# Patient Record
Sex: MALE | Race: WHITE | Employment: FULL TIME | URBAN - METROPOLITAN AREA
[De-identification: names, ages, dates, MRNs, and addresses within clinical notes are randomized per-mention and may not be internally consistent; named-entity substitution may affect disease eponyms.]

---

## 2017-02-27 ENCOUNTER — OFFICE VISIT (OUTPATIENT)
Dept: FAMILY MEDICINE CLINIC | Facility: CLINIC | Age: 52
End: 2017-02-27

## 2017-02-27 VITALS
BODY MASS INDEX: 39.4 KG/M2 | TEMPERATURE: 98 F | WEIGHT: 266 LBS | HEIGHT: 69 IN | DIASTOLIC BLOOD PRESSURE: 92 MMHG | SYSTOLIC BLOOD PRESSURE: 134 MMHG | RESPIRATION RATE: 16 BRPM | HEART RATE: 80 BPM

## 2017-02-27 DIAGNOSIS — R40.0 DAYTIME SOMNOLENCE: ICD-10-CM

## 2017-02-27 DIAGNOSIS — R06.83 SNORING: ICD-10-CM

## 2017-02-27 DIAGNOSIS — I10 ESSENTIAL HYPERTENSION: ICD-10-CM

## 2017-02-27 DIAGNOSIS — Z00.00 ANNUAL PHYSICAL EXAM: Primary | ICD-10-CM

## 2017-02-27 DIAGNOSIS — Z13.89 SCREENING FOR GENITOURINARY CONDITION: ICD-10-CM

## 2017-02-27 DIAGNOSIS — E78.00 HYPERCHOLESTEREMIA: ICD-10-CM

## 2017-02-27 LAB
APPEARANCE: CLEAR
PH, URINE: 5.5 (ref 4.5–8)
SPECIFIC GRAVITY: 1.03 (ref 1–1.03)
URINE-COLOR: YELLOW
UROBILINOGEN,SEMI-QN: 0.2 MG/DL (ref 0–1.9)

## 2017-02-27 PROCEDURE — 99396 PREV VISIT EST AGE 40-64: CPT | Performed by: FAMILY MEDICINE

## 2017-02-27 PROCEDURE — 81003 URINALYSIS AUTO W/O SCOPE: CPT | Performed by: FAMILY MEDICINE

## 2017-02-27 NOTE — PROGRESS NOTES
Yolie Lugo is a 46year old male who presents for a complete physical exam.   HPI:   Pt states his HTN has been under good control. He denies chest pain or unusual SOB. Brennan Zamarripa He takes simvastatin for his hypercholesterolemia without problem.   Has a hx of lo 4,000 Int'l Units by mouth daily. Disp:  Rfl:    Coenzyme Q10 (COQ-10 OR) Take 1 capsule by mouth daily. Disp:  Rfl:    Lactobacillus (PROBIATA OR) Take 1 tablet by mouth daily. Disp:  Rfl:    aspirin 81 MG Oral Tab Take 81 mg by mouth daily.  Disp:  Rfl: exertion  CARDIOVASCULAR: denies chest pain on exertion  GI: denies abdominal pain,denies heartburn  : denies nocturia or changes in stream  MUSCULOSKELETAL: denies back pain  NEURO: denies headaches  PSYCHE: denies depression or anxiety  HEMATOLOGIC: de

## 2017-02-28 ENCOUNTER — LAB ENCOUNTER (OUTPATIENT)
Dept: LAB | Age: 52
End: 2017-02-28
Attending: FAMILY MEDICINE
Payer: COMMERCIAL

## 2017-02-28 DIAGNOSIS — Z13.0 SCREENING FOR DEFICIENCY ANEMIA: ICD-10-CM

## 2017-02-28 DIAGNOSIS — E78.00 HYPERCHOLESTEREMIA: ICD-10-CM

## 2017-02-28 DIAGNOSIS — Z12.5 SCREENING FOR PROSTATE CANCER: ICD-10-CM

## 2017-02-28 LAB
ALBUMIN SERPL-MCNC: 4 G/DL (ref 3.5–4.8)
ALP LIVER SERPL-CCNC: 73 U/L (ref 45–117)
ALT SERPL-CCNC: 29 U/L (ref 17–63)
AST SERPL-CCNC: 20 U/L (ref 15–41)
BASOPHILS # BLD AUTO: 0.09 X10(3) UL (ref 0–0.1)
BASOPHILS NFR BLD AUTO: 1.8 %
BILIRUB SERPL-MCNC: 0.5 MG/DL (ref 0.1–2)
BUN BLD-MCNC: 17 MG/DL (ref 8–20)
CALCIUM BLD-MCNC: 8.9 MG/DL (ref 8.3–10.3)
CHLORIDE: 105 MMOL/L (ref 101–111)
CHOLEST SMN-MCNC: 174 MG/DL (ref ?–200)
CO2: 28 MMOL/L (ref 22–32)
CREAT BLD-MCNC: 0.91 MG/DL (ref 0.7–1.3)
EOSINOPHIL # BLD AUTO: 0.28 X10(3) UL (ref 0–0.3)
EOSINOPHIL NFR BLD AUTO: 5.7 %
ERYTHROCYTE [DISTWIDTH] IN BLOOD BY AUTOMATED COUNT: 13.1 % (ref 11.5–16)
GLUCOSE BLD-MCNC: 102 MG/DL (ref 70–99)
HCT VFR BLD AUTO: 41.5 % (ref 37–53)
HDLC SERPL-MCNC: 46 MG/DL (ref 45–?)
HDLC SERPL: 3.78 {RATIO} (ref ?–4.97)
HGB BLD-MCNC: 13.6 G/DL (ref 13–17)
IMMATURE GRANULOCYTE COUNT: 0.02 X10(3) UL (ref 0–1)
IMMATURE GRANULOCYTE RATIO %: 0.4 %
LDLC SERPL CALC-MCNC: 76 MG/DL (ref ?–130)
LYMPHOCYTES # BLD AUTO: 2.11 X10(3) UL (ref 0.9–4)
LYMPHOCYTES NFR BLD AUTO: 43.1 %
M PROTEIN MFR SERPL ELPH: 7.4 G/DL (ref 6.1–8.3)
MCH RBC QN AUTO: 29.6 PG (ref 27–33.2)
MCHC RBC AUTO-ENTMCNC: 32.8 G/DL (ref 31–37)
MCV RBC AUTO: 90.2 FL (ref 80–99)
MONOCYTES # BLD AUTO: 0.47 X10(3) UL (ref 0.1–0.6)
MONOCYTES NFR BLD AUTO: 9.6 %
NEUTROPHIL ABS PRELIM: 1.92 X10 (3) UL (ref 1.3–6.7)
NEUTROPHILS # BLD AUTO: 1.92 X10(3) UL (ref 1.3–6.7)
NEUTROPHILS NFR BLD AUTO: 39.4 %
NONHDLC SERPL-MCNC: 128 MG/DL (ref ?–130)
PLATELET # BLD AUTO: 263 10(3)UL (ref 150–450)
POTASSIUM SERPL-SCNC: 4.5 MMOL/L (ref 3.6–5.1)
PSA SERPL-MCNC: 0.73 NG/ML (ref 0.01–4)
RBC # BLD AUTO: 4.6 X10(6)UL (ref 4.3–5.7)
RED CELL DISTRIBUTION WIDTH-SD: 42.8 FL (ref 35.1–46.3)
SODIUM SERPL-SCNC: 139 MMOL/L (ref 136–144)
TRIGLYCERIDES: 262 MG/DL (ref ?–150)
VLDL: 52 MG/DL (ref 5–40)
WBC # BLD AUTO: 4.9 X10(3) UL (ref 4–13)

## 2017-02-28 PROCEDURE — 36415 COLL VENOUS BLD VENIPUNCTURE: CPT

## 2017-02-28 PROCEDURE — 85025 COMPLETE CBC W/AUTO DIFF WBC: CPT

## 2017-02-28 PROCEDURE — 84153 ASSAY OF PSA TOTAL: CPT

## 2017-02-28 PROCEDURE — 80061 LIPID PANEL: CPT

## 2017-02-28 PROCEDURE — 80053 COMPREHEN METABOLIC PANEL: CPT

## 2017-03-01 ENCOUNTER — TELEPHONE (OUTPATIENT)
Dept: FAMILY MEDICINE CLINIC | Facility: CLINIC | Age: 52
End: 2017-03-01

## 2017-03-01 DIAGNOSIS — E78.00 HYPERCHOLESTEREMIA: Primary | ICD-10-CM

## 2017-03-14 ENCOUNTER — OFFICE VISIT (OUTPATIENT)
Dept: SLEEP CENTER | Facility: HOSPITAL | Age: 52
End: 2017-03-14
Attending: FAMILY MEDICINE
Payer: COMMERCIAL

## 2017-03-14 PROCEDURE — 95806 SLEEP STUDY UNATT&RESP EFFT: CPT

## 2017-03-16 ENCOUNTER — HOSPITAL ENCOUNTER (OUTPATIENT)
Dept: CV DIAGNOSTICS | Facility: HOSPITAL | Age: 52
Discharge: HOME | End: 2017-03-16
Attending: FAMILY MEDICINE
Payer: COMMERCIAL

## 2017-03-16 DIAGNOSIS — Z82.49 FAMILY HISTORY OF HEART DISEASE: ICD-10-CM

## 2017-03-16 DIAGNOSIS — R93.1 ABNORMAL SCREENING CARDIAC CT: ICD-10-CM

## 2017-03-16 DIAGNOSIS — I10 ESSENTIAL HYPERTENSION: ICD-10-CM

## 2017-03-16 DIAGNOSIS — E66.9 OBESITY WITH BODY MASS INDEX OF 30.0-39.9: ICD-10-CM

## 2017-03-16 DIAGNOSIS — E78.00 HYPERCHOLESTEREMIA: ICD-10-CM

## 2017-03-16 PROCEDURE — 93350 STRESS TTE ONLY: CPT

## 2017-03-16 PROCEDURE — 93350 STRESS TTE ONLY: CPT | Performed by: INTERNAL MEDICINE

## 2017-03-16 PROCEDURE — 93017 CV STRESS TEST TRACING ONLY: CPT

## 2017-03-16 PROCEDURE — 93018 CV STRESS TEST I&R ONLY: CPT | Performed by: INTERNAL MEDICINE

## 2017-03-27 RX ORDER — VALSARTAN 160 MG/1
TABLET ORAL
Qty: 90 TABLET | Refills: 0 | Status: SHIPPED | OUTPATIENT
Start: 2017-03-27 | End: 2017-06-25

## 2017-03-27 RX ORDER — SIMVASTATIN 10 MG
TABLET ORAL
Qty: 90 TABLET | Refills: 0 | Status: SHIPPED | OUTPATIENT
Start: 2017-03-27 | End: 2017-06-25

## 2017-03-31 NOTE — PROCEDURES
[address]       Accredited by the [facility] of Sleep Medicine ([facility])    PATIENT'S NAME:        Almasleandra Izaadarsh  ATTENDING PHYSICIAN:   Les Graham M.D.   REFERRING PHYSICIAN:   MAYELIN Hernandez position. RECOMMENDATIONS:    1. Given severity of sleep-disordered breathing, it is recommend the patient pursue definitive therapy by undergoing CPAP desensitization followed by CPAP titration.   Alternatively, if he is unable to undergo CPAP titrati

## 2017-03-31 NOTE — PROGRESS NOTES
Quick Note:    Pt will be notified by CPAP coordinator and f/u with sleep clinic will be made.   ______

## 2017-04-01 NOTE — PROCEDURES
[address]       Accredited by the [facility] of Sleep Medicine (AASM)    PATIENT'S NAME:        Dorothy Maldonado  ATTENDING PHYSICIAN:   Mya Tong M.D.   REFERRING PHYSICIAN:   Mayda Echeverria M.D.  PA SaO2 dwight of 83%. The patient spent about 7%, which was 33 minutes, of recording time with oxygen saturation levels of 88% or below. Snoring was noted during the study. Heart rate averaged 67.     IMPRESSION:  This home sleep test documents moderate obs

## 2017-06-26 RX ORDER — VALSARTAN 160 MG/1
TABLET ORAL
Qty: 90 TABLET | Refills: 0 | Status: SHIPPED | OUTPATIENT
Start: 2017-06-26 | End: 2017-09-24

## 2017-06-26 RX ORDER — SIMVASTATIN 10 MG
TABLET ORAL
Qty: 90 TABLET | Refills: 1 | Status: SHIPPED | OUTPATIENT
Start: 2017-06-26 | End: 2017-12-23

## 2017-07-26 PROBLEM — G47.33 OSA (OBSTRUCTIVE SLEEP APNEA): Status: ACTIVE | Noted: 2017-07-26

## 2017-09-25 RX ORDER — VALSARTAN 160 MG/1
TABLET ORAL
Qty: 90 TABLET | Refills: 0 | Status: SHIPPED | OUTPATIENT
Start: 2017-09-25 | End: 2017-12-24

## 2017-12-14 ENCOUNTER — OFFICE VISIT (OUTPATIENT)
Dept: FAMILY MEDICINE CLINIC | Facility: CLINIC | Age: 52
End: 2017-12-14

## 2017-12-14 VITALS
HEIGHT: 70 IN | RESPIRATION RATE: 16 BRPM | TEMPERATURE: 97 F | SYSTOLIC BLOOD PRESSURE: 150 MMHG | WEIGHT: 254 LBS | BODY MASS INDEX: 36.36 KG/M2 | HEART RATE: 78 BPM | DIASTOLIC BLOOD PRESSURE: 90 MMHG

## 2017-12-14 DIAGNOSIS — I10 ESSENTIAL HYPERTENSION: Primary | ICD-10-CM

## 2017-12-14 DIAGNOSIS — E66.9 OBESITY WITH BODY MASS INDEX OF 30.0-39.9: ICD-10-CM

## 2017-12-14 DIAGNOSIS — E78.00 HYPERCHOLESTEREMIA: ICD-10-CM

## 2017-12-14 PROCEDURE — 99214 OFFICE O/P EST MOD 30 MIN: CPT | Performed by: FAMILY MEDICINE

## 2017-12-14 NOTE — PROGRESS NOTES
HPI:   Art Blind is a 45year old male who presents for recheck of hyperlipidemia. Patient reports taking medications as instructed, no medication side effects noted. Denies any generalized muscle aches.  Patient presents for recheck of his hypertension ----------       Current Outpatient Prescriptions:  VALSARTAN 160 MG Oral Tab TAKE 1 TABLET DAILY Disp: 90 tablet Rfl: 0   SIMVASTATIN 10 MG Oral Tab TAKE 1 TABLET NIGHTLY Disp: 90 tablet Rfl: 1   Omeprazole 40 MG Oral Capsule Delayed Release Take 40 mg lesions or rashes  RESPIRATORY: denies shortness of breath with exertion  CARDIOVASCULAR: denies chest pain on exertion  GI: denies abdominal pain and denies heartburn  NEURO: denies headaches    EXAM:   /90   Pulse 78   Temp (!) 97.3 °F (36.3 °C) (O

## 2017-12-26 RX ORDER — SIMVASTATIN 10 MG
TABLET ORAL
Qty: 90 TABLET | Refills: 0 | Status: SHIPPED | OUTPATIENT
Start: 2017-12-26 | End: 2018-03-26

## 2017-12-26 RX ORDER — VALSARTAN 160 MG/1
TABLET ORAL
Qty: 90 TABLET | Refills: 1 | Status: SHIPPED | OUTPATIENT
Start: 2017-12-26 | End: 2018-03-12

## 2018-02-13 ENCOUNTER — APPOINTMENT (OUTPATIENT)
Dept: LAB | Age: 53
End: 2018-02-13
Attending: FAMILY MEDICINE
Payer: COMMERCIAL

## 2018-02-13 DIAGNOSIS — E78.00 HYPERCHOLESTEREMIA: ICD-10-CM

## 2018-02-13 LAB
ALBUMIN SERPL-MCNC: 4 G/DL (ref 3.5–4.8)
ALP LIVER SERPL-CCNC: 70 U/L (ref 45–117)
ALT SERPL-CCNC: 27 U/L (ref 17–63)
AST SERPL-CCNC: 16 U/L (ref 15–41)
BILIRUB SERPL-MCNC: 0.6 MG/DL (ref 0.1–2)
BUN BLD-MCNC: 15 MG/DL (ref 8–20)
CALCIUM BLD-MCNC: 9.2 MG/DL (ref 8.3–10.3)
CHLORIDE: 106 MMOL/L (ref 101–111)
CHOLEST SMN-MCNC: 170 MG/DL (ref ?–200)
CO2: 28 MMOL/L (ref 22–32)
CREAT BLD-MCNC: 0.9 MG/DL (ref 0.7–1.3)
GLUCOSE BLD-MCNC: 102 MG/DL (ref 70–99)
HDLC SERPL-MCNC: 44 MG/DL (ref 45–?)
HDLC SERPL: 3.86 {RATIO} (ref ?–4.97)
LDLC SERPL CALC-MCNC: 88 MG/DL (ref ?–130)
M PROTEIN MFR SERPL ELPH: 7.1 G/DL (ref 6.1–8.3)
NONHDLC SERPL-MCNC: 126 MG/DL (ref ?–130)
POTASSIUM SERPL-SCNC: 4.5 MMOL/L (ref 3.6–5.1)
SODIUM SERPL-SCNC: 140 MMOL/L (ref 136–144)
TRIGL SERPL-MCNC: 191 MG/DL (ref ?–150)
VLDLC SERPL CALC-MCNC: 38 MG/DL (ref 5–40)

## 2018-02-13 PROCEDURE — 36415 COLL VENOUS BLD VENIPUNCTURE: CPT

## 2018-02-13 PROCEDURE — 80061 LIPID PANEL: CPT

## 2018-02-13 PROCEDURE — 80053 COMPREHEN METABOLIC PANEL: CPT

## 2018-03-12 ENCOUNTER — OFFICE VISIT (OUTPATIENT)
Dept: FAMILY MEDICINE CLINIC | Facility: CLINIC | Age: 53
End: 2018-03-12

## 2018-03-12 VITALS
SYSTOLIC BLOOD PRESSURE: 160 MMHG | HEART RATE: 84 BPM | WEIGHT: 267 LBS | DIASTOLIC BLOOD PRESSURE: 92 MMHG | BODY MASS INDEX: 38.22 KG/M2 | RESPIRATION RATE: 16 BRPM | HEIGHT: 70 IN | TEMPERATURE: 98 F

## 2018-03-12 DIAGNOSIS — I10 ESSENTIAL HYPERTENSION: ICD-10-CM

## 2018-03-12 DIAGNOSIS — Z00.00 ANNUAL PHYSICAL EXAM: Primary | ICD-10-CM

## 2018-03-12 DIAGNOSIS — E78.00 HYPERCHOLESTEREMIA: ICD-10-CM

## 2018-03-12 DIAGNOSIS — Z12.5 SCREENING FOR PROSTATE CANCER: ICD-10-CM

## 2018-03-12 PROCEDURE — 99214 OFFICE O/P EST MOD 30 MIN: CPT | Performed by: FAMILY MEDICINE

## 2018-03-12 RX ORDER — VALSARTAN 320 MG/1
320 TABLET ORAL DAILY
Qty: 90 TABLET | Refills: 1 | Status: SHIPPED | OUTPATIENT
Start: 2018-03-12 | End: 2019-03-08 | Stop reason: ALTCHOICE

## 2018-03-12 NOTE — PROGRESS NOTES
Pt advised to increase his valsartan to 320mg daily. He will double his current dose until almost out, then will contact us to refill.

## 2018-03-12 NOTE — PROGRESS NOTES
Geovanny Gomez is a 45year old male who presents for a complete physical exam.   HPI:   Pt states his HTN has been under good control. He denies chest pain or unusual SOB. Brittany Spar He takes simvastatin for his hypercholesterolemia without problem.   He has been us Oral Tab TAKE 1 TABLET DAILY Disp: 90 tablet Rfl: 1   Omeprazole 40 MG Oral Capsule Delayed Release Take 40 mg by mouth daily. Disp:  Rfl:    Ergocalciferol (VITAMIN D OR) Take 4,000 Int'l Units by mouth daily.  Disp:  Rfl:    Coenzyme Q10 (COQ-10 OR) Take Y. Children: 3.    Exercise: minimal.  Diet: watches fats closely    REVIEW OF SYSTEMS:   GENERAL: feels well otherwise  SKIN: denies any unusual skin lesions  EYES:denies blurred vision or double vision  HEENT: denies nasal congestion, sinus pain or ST  ALYSSA prostate cancer  - PSA;  Future    Nurse blood pressure check in 2 weeks    Work on diet, exercise and weight loss  See again in 3 months

## 2018-03-26 RX ORDER — SIMVASTATIN 10 MG
TABLET ORAL
Qty: 90 TABLET | Refills: 0 | Status: SHIPPED | OUTPATIENT
Start: 2018-03-26 | End: 2018-06-24

## 2018-06-05 ENCOUNTER — OFFICE VISIT (OUTPATIENT)
Dept: FAMILY MEDICINE CLINIC | Facility: CLINIC | Age: 53
End: 2018-06-05

## 2018-06-05 VITALS
BODY MASS INDEX: 35 KG/M2 | RESPIRATION RATE: 16 BRPM | HEART RATE: 56 BPM | DIASTOLIC BLOOD PRESSURE: 94 MMHG | WEIGHT: 246 LBS | TEMPERATURE: 98 F | SYSTOLIC BLOOD PRESSURE: 146 MMHG

## 2018-06-05 DIAGNOSIS — M67.912 DISORDER OF LEFT ROTATOR CUFF: Primary | ICD-10-CM

## 2018-06-05 PROCEDURE — 99213 OFFICE O/P EST LOW 20 MIN: CPT | Performed by: FAMILY MEDICINE

## 2018-06-05 NOTE — PROGRESS NOTES
Usman Thomson is a 45year old male. HPI:   Has been working out with a  recently to help with his weight loss. States over the past 3-4 weeks his left shoulder has been painful especially with abduction and and external rotation.   No paresthesias rashes  RESPIRATORY: denies shortness of breath with exertion  CARDIOVASCULAR: denies chest pain on exertion  GI: denies abdominal pain and denies heartburn  NEURO: denies headaches    EXAM:   BP (!) 154/96   Pulse 56   Temp 98.2 °F (36.8 °C)   Resp 16   W

## 2018-06-25 RX ORDER — SIMVASTATIN 10 MG
TABLET ORAL
Qty: 90 TABLET | Refills: 0 | Status: SHIPPED | OUTPATIENT
Start: 2018-06-25 | End: 2018-09-23

## 2018-09-04 ENCOUNTER — TELEPHONE (OUTPATIENT)
Dept: FAMILY MEDICINE CLINIC | Facility: CLINIC | Age: 53
End: 2018-09-04

## 2018-09-11 ENCOUNTER — OFFICE VISIT (OUTPATIENT)
Dept: FAMILY MEDICINE CLINIC | Facility: CLINIC | Age: 53
End: 2018-09-11
Payer: COMMERCIAL

## 2018-09-11 VITALS
HEIGHT: 70 IN | DIASTOLIC BLOOD PRESSURE: 82 MMHG | WEIGHT: 223 LBS | SYSTOLIC BLOOD PRESSURE: 132 MMHG | HEART RATE: 64 BPM | BODY MASS INDEX: 31.92 KG/M2 | TEMPERATURE: 97 F

## 2018-09-11 DIAGNOSIS — E66.9 OBESITY WITH BODY MASS INDEX OF 30.0-39.9: Primary | ICD-10-CM

## 2018-09-11 DIAGNOSIS — Z82.49 FAMILY HISTORY OF HEART DISEASE: ICD-10-CM

## 2018-09-11 DIAGNOSIS — I10 ESSENTIAL HYPERTENSION: ICD-10-CM

## 2018-09-11 DIAGNOSIS — E78.00 HYPERCHOLESTEREMIA: ICD-10-CM

## 2018-09-11 PROCEDURE — 99214 OFFICE O/P EST MOD 30 MIN: CPT | Performed by: FAMILY MEDICINE

## 2018-09-11 NOTE — PROGRESS NOTES
HPI:   Debby Millan is a 52year old male who presents for recheck of hyperlipidemia. Patient reports taking medications as instructed, no medication side effects noted. Denies any generalized muscle aches.  Patient presents for recheck of his hypertension mg total) by mouth daily. Disp: 90 tablet Rfl: 1   Omeprazole 40 MG Oral Capsule Delayed Release Take 40 mg by mouth daily. Disp:  Rfl:    Ergocalciferol (VITAMIN D OR) Take 4,000 Int'l Units by mouth daily.  Disp:  Rfl:    Coenzyme Q10 (COQ-10 OR) Take 1 c REVIEW OF SYSTEMS:   GENERAL HEALTH: feels well otherwise  SKIN: denies any unusual skin lesions or rashes  RESPIRATORY: denies shortness of breath with exertion  CARDIOVASCULAR: denies chest pain on exertion  GI: denies abdominal pain and denies heart

## 2018-09-19 ENCOUNTER — LAB ENCOUNTER (OUTPATIENT)
Dept: LAB | Age: 53
End: 2018-09-19
Attending: FAMILY MEDICINE
Payer: COMMERCIAL

## 2018-09-19 DIAGNOSIS — I10 ESSENTIAL HYPERTENSION: ICD-10-CM

## 2018-09-19 DIAGNOSIS — Z12.5 SCREENING FOR PROSTATE CANCER: ICD-10-CM

## 2018-09-19 LAB
BASOPHILS # BLD AUTO: 0.06 X10(3) UL (ref 0–0.1)
BASOPHILS NFR BLD AUTO: 1.2 %
EOSINOPHIL # BLD AUTO: 0.16 X10(3) UL (ref 0–0.3)
EOSINOPHIL NFR BLD AUTO: 3.3 %
ERYTHROCYTE [DISTWIDTH] IN BLOOD BY AUTOMATED COUNT: 14.3 % (ref 11.5–16)
HCT VFR BLD AUTO: 42.4 % (ref 37–53)
HGB BLD-MCNC: 13.3 G/DL (ref 13–17)
IMMATURE GRANULOCYTE COUNT: 0.01 X10(3) UL (ref 0–1)
IMMATURE GRANULOCYTE RATIO %: 0.2 %
LYMPHOCYTES # BLD AUTO: 2.15 X10(3) UL (ref 0.9–4)
LYMPHOCYTES NFR BLD AUTO: 44.4 %
MCH RBC QN AUTO: 29 PG (ref 27–33.2)
MCHC RBC AUTO-ENTMCNC: 31.4 G/DL (ref 31–37)
MCV RBC AUTO: 92.6 FL (ref 80–99)
MONOCYTES # BLD AUTO: 0.46 X10(3) UL (ref 0.1–1)
MONOCYTES NFR BLD AUTO: 9.5 %
NEUTROPHIL ABS PRELIM: 2 X10 (3) UL (ref 1.3–6.7)
NEUTROPHILS # BLD AUTO: 2 X10(3) UL (ref 1.3–6.7)
NEUTROPHILS NFR BLD AUTO: 41.4 %
PLATELET # BLD AUTO: 233 10(3)UL (ref 150–450)
PSA SERPL-MCNC: 0.9 NG/ML (ref 0.01–4)
RBC # BLD AUTO: 4.58 X10(6)UL (ref 4.3–5.7)
RED CELL DISTRIBUTION WIDTH-SD: 48.6 FL (ref 35.1–46.3)
WBC # BLD AUTO: 4.8 X10(3) UL (ref 4–13)

## 2018-09-19 PROCEDURE — 36415 COLL VENOUS BLD VENIPUNCTURE: CPT

## 2018-09-19 PROCEDURE — 84153 ASSAY OF PSA TOTAL: CPT

## 2018-09-19 PROCEDURE — 85025 COMPLETE CBC W/AUTO DIFF WBC: CPT

## 2018-09-25 RX ORDER — SIMVASTATIN 10 MG
TABLET ORAL
Qty: 90 TABLET | Refills: 1 | Status: SHIPPED | OUTPATIENT
Start: 2018-09-25 | End: 2019-03-24

## 2018-09-26 PROBLEM — Z12.11 SPECIAL SCREENING FOR MALIGNANT NEOPLASM OF COLON: Status: ACTIVE | Noted: 2018-09-26

## 2018-10-30 RX ORDER — LOSARTAN POTASSIUM 100 MG/1
TABLET ORAL
Qty: 90 TABLET | Refills: 0 | Status: SHIPPED | OUTPATIENT
Start: 2018-10-30 | End: 2019-01-27

## 2019-01-28 RX ORDER — LOSARTAN POTASSIUM 100 MG/1
TABLET ORAL
Qty: 90 TABLET | Refills: 0 | Status: SHIPPED | OUTPATIENT
Start: 2019-01-28 | End: 2019-04-12 | Stop reason: ALTCHOICE

## 2019-03-08 ENCOUNTER — OFFICE VISIT (OUTPATIENT)
Dept: FAMILY MEDICINE CLINIC | Facility: CLINIC | Age: 54
End: 2019-03-08
Payer: COMMERCIAL

## 2019-03-08 VITALS
DIASTOLIC BLOOD PRESSURE: 92 MMHG | WEIGHT: 217 LBS | HEIGHT: 70 IN | BODY MASS INDEX: 31.07 KG/M2 | TEMPERATURE: 97 F | SYSTOLIC BLOOD PRESSURE: 152 MMHG | HEART RATE: 72 BPM | RESPIRATION RATE: 16 BRPM

## 2019-03-08 DIAGNOSIS — Z00.00 ANNUAL PHYSICAL EXAM: Primary | ICD-10-CM

## 2019-03-08 DIAGNOSIS — E78.00 HYPERCHOLESTEREMIA: ICD-10-CM

## 2019-03-08 DIAGNOSIS — I10 ESSENTIAL HYPERTENSION: ICD-10-CM

## 2019-03-08 PROCEDURE — 99396 PREV VISIT EST AGE 40-64: CPT | Performed by: FAMILY MEDICINE

## 2019-03-08 NOTE — PROGRESS NOTES
Adriana Carmen is a 52year old male who presents for a complete physical exam.   HPI:   Pt states his HTN has been under good control. He denies chest pain or unusual SOB. Kranthi Casey He takes simvastatin for his hypercholesterolemia without problem.   He has been us mouth daily. Disp:  Rfl:    Ergocalciferol (VITAMIN D OR) Take 4,000 Int'l Units by mouth daily. Disp:  Rfl:    Coenzyme Q10 (COQ-10 OR) Take 1 capsule by mouth daily. Disp:  Rfl:    Lactobacillus (PROBIATA OR) Take 1 tablet by mouth daily.  Disp:  Rfl: per week, with .   Diet: watches fats closely    REVIEW OF SYSTEMS:   GENERAL: feels well otherwise  SKIN: denies any unusual skin lesions  EYES:denies blurred vision or double vision  HEENT: denies nasal congestion, sinus pain or ST  LUNGS: denies s Refills for this Visit:  Requested Prescriptions      No prescriptions requested or ordered in this encounter       Imaging & Consults:  None  Patient will check his blood pressure at home for 10 days and forward me the results.   Work on diet, exercise and

## 2019-03-16 ENCOUNTER — PATIENT MESSAGE (OUTPATIENT)
Dept: FAMILY MEDICINE CLINIC | Facility: CLINIC | Age: 54
End: 2019-03-16

## 2019-03-18 ENCOUNTER — LAB ENCOUNTER (OUTPATIENT)
Dept: LAB | Age: 54
End: 2019-03-18
Attending: FAMILY MEDICINE
Payer: COMMERCIAL

## 2019-03-18 DIAGNOSIS — Z00.00 ANNUAL PHYSICAL EXAM: ICD-10-CM

## 2019-03-18 LAB
ALBUMIN SERPL-MCNC: 3.9 G/DL (ref 3.4–5)
ALBUMIN/GLOB SERPL: 1.3 {RATIO} (ref 1–2)
ALP LIVER SERPL-CCNC: 69 U/L (ref 45–117)
ALT SERPL-CCNC: 24 U/L (ref 16–61)
ANION GAP SERPL CALC-SCNC: 6 MMOL/L (ref 0–18)
AST SERPL-CCNC: 15 U/L (ref 15–37)
BASOPHILS # BLD AUTO: 0.09 X10(3) UL (ref 0–0.2)
BASOPHILS NFR BLD AUTO: 2.2 %
BILIRUB SERPL-MCNC: 0.7 MG/DL (ref 0.1–2)
BILIRUB UR QL STRIP.AUTO: NEGATIVE
BUN BLD-MCNC: 17 MG/DL (ref 7–18)
BUN/CREAT SERPL: 18.3 (ref 10–20)
CALCIUM BLD-MCNC: 9.4 MG/DL (ref 8.5–10.1)
CHLORIDE SERPL-SCNC: 105 MMOL/L (ref 98–107)
CHOLEST SMN-MCNC: 141 MG/DL (ref ?–200)
CLARITY UR REFRACT.AUTO: CLEAR
CO2 SERPL-SCNC: 27 MMOL/L (ref 21–32)
COLOR UR AUTO: YELLOW
CREAT BLD-MCNC: 0.93 MG/DL (ref 0.7–1.3)
DEPRECATED RDW RBC AUTO: 45.1 FL (ref 35.1–46.3)
EOSINOPHIL # BLD AUTO: 0.15 X10(3) UL (ref 0–0.7)
EOSINOPHIL NFR BLD AUTO: 3.6 %
ERYTHROCYTE [DISTWIDTH] IN BLOOD BY AUTOMATED COUNT: 13.2 % (ref 11–15)
GLOBULIN PLAS-MCNC: 3 G/DL (ref 2.8–4.4)
GLUCOSE BLD-MCNC: 106 MG/DL (ref 70–99)
GLUCOSE UR STRIP.AUTO-MCNC: NEGATIVE MG/DL
HCT VFR BLD AUTO: 43.3 % (ref 39–53)
HDLC SERPL-MCNC: 51 MG/DL (ref 40–59)
HGB BLD-MCNC: 14.2 G/DL (ref 13–17.5)
IMM GRANULOCYTES # BLD AUTO: 0.01 X10(3) UL (ref 0–1)
IMM GRANULOCYTES NFR BLD: 0.2 %
KETONES UR STRIP.AUTO-MCNC: NEGATIVE MG/DL
LDLC SERPL CALC-MCNC: 76 MG/DL (ref ?–100)
LEUKOCYTE ESTERASE UR QL STRIP.AUTO: NEGATIVE
LYMPHOCYTES # BLD AUTO: 2.09 X10(3) UL (ref 1–4)
LYMPHOCYTES NFR BLD AUTO: 50.9 %
M PROTEIN MFR SERPL ELPH: 6.9 G/DL (ref 6.4–8.2)
MCH RBC QN AUTO: 30.8 PG (ref 26–34)
MCHC RBC AUTO-ENTMCNC: 32.8 G/DL (ref 31–37)
MCV RBC AUTO: 93.9 FL (ref 80–100)
MONOCYTES # BLD AUTO: 0.39 X10(3) UL (ref 0.1–1)
MONOCYTES NFR BLD AUTO: 9.5 %
NEUTROPHILS # BLD AUTO: 1.38 X10 (3) UL (ref 1.5–7.7)
NEUTROPHILS # BLD AUTO: 1.38 X10(3) UL (ref 1.5–7.7)
NEUTROPHILS NFR BLD AUTO: 33.6 %
NITRITE UR QL STRIP.AUTO: NEGATIVE
NONHDLC SERPL-MCNC: 90 MG/DL (ref ?–130)
OSMOLALITY SERPL CALC.SUM OF ELEC: 288 MOSM/KG (ref 275–295)
PH UR STRIP.AUTO: 6 [PH] (ref 4.5–8)
PLATELET # BLD AUTO: 238 10(3)UL (ref 150–450)
POTASSIUM SERPL-SCNC: 4.9 MMOL/L (ref 3.5–5.1)
PROT UR STRIP.AUTO-MCNC: NEGATIVE MG/DL
RBC # BLD AUTO: 4.61 X10(6)UL (ref 4.3–5.7)
RBC UR QL AUTO: NEGATIVE
SODIUM SERPL-SCNC: 138 MMOL/L (ref 136–145)
SP GR UR STRIP.AUTO: 1.02 (ref 1–1.03)
TRIGL SERPL-MCNC: 69 MG/DL (ref 30–149)
UROBILINOGEN UR STRIP.AUTO-MCNC: <2 MG/DL
VLDLC SERPL CALC-MCNC: 14 MG/DL (ref 0–30)
WBC # BLD AUTO: 4.1 X10(3) UL (ref 4–11)

## 2019-03-18 PROCEDURE — 85025 COMPLETE CBC W/AUTO DIFF WBC: CPT

## 2019-03-18 PROCEDURE — 80061 LIPID PANEL: CPT

## 2019-03-18 PROCEDURE — 36415 COLL VENOUS BLD VENIPUNCTURE: CPT

## 2019-03-18 PROCEDURE — 80053 COMPREHEN METABOLIC PANEL: CPT

## 2019-03-18 PROCEDURE — 81003 URINALYSIS AUTO W/O SCOPE: CPT

## 2019-03-18 NOTE — TELEPHONE ENCOUNTER
From: Francie Pollock  To: Surekha Cavanaugh MD  Sent: 3/16/2019 8:33 AM CDT  Subject: Visit Follow-up Question    You had asked me to chart my bp for a week.  My readings were as follows  Monday 133/84  Tuesday 131/83  Wednesday 131/84  Thursday 153/91  Fr

## 2019-03-19 DIAGNOSIS — D72.820 LYMPHOCYTOSIS: Primary | ICD-10-CM

## 2019-03-19 NOTE — TELEPHONE ENCOUNTER
The readings are still higher than what they should be. Have him come by sometime this week for 1 more nurse blood pressure check and if his pressure is still slightly elevated we will add hydrochlorothiazide.

## 2019-03-21 ENCOUNTER — NURSE ONLY (OUTPATIENT)
Dept: FAMILY MEDICINE CLINIC | Facility: CLINIC | Age: 54
End: 2019-03-21
Payer: COMMERCIAL

## 2019-03-21 VITALS — HEART RATE: 84 BPM | SYSTOLIC BLOOD PRESSURE: 136 MMHG | DIASTOLIC BLOOD PRESSURE: 76 MMHG

## 2019-03-21 DIAGNOSIS — I10 ESSENTIAL HYPERTENSION: Primary | ICD-10-CM

## 2019-03-21 PROCEDURE — 99211 OFF/OP EST MAY X REQ PHY/QHP: CPT | Performed by: FAMILY MEDICINE

## 2019-03-21 RX ORDER — HYDROCHLOROTHIAZIDE 12.5 MG/1
12.5 CAPSULE, GELATIN COATED ORAL DAILY
Qty: 30 CAPSULE | Refills: 0 | Status: SHIPPED | OUTPATIENT
Start: 2019-03-21 | End: 2019-04-12 | Stop reason: ALTCHOICE

## 2019-03-21 NOTE — PROGRESS NOTES
Pt here for BP check 136/76,  aware of reading and ordered HCTZ 12.5 mg daily and recheck nurse visit in 10 days.

## 2019-03-25 RX ORDER — SIMVASTATIN 10 MG
TABLET ORAL
Qty: 90 TABLET | Refills: 1 | Status: SHIPPED | OUTPATIENT
Start: 2019-03-25 | End: 2019-09-21

## 2019-04-08 ENCOUNTER — LAB ENCOUNTER (OUTPATIENT)
Dept: LAB | Age: 54
End: 2019-04-08
Attending: FAMILY MEDICINE
Payer: COMMERCIAL

## 2019-04-08 DIAGNOSIS — D72.820 LYMPHOCYTOSIS: ICD-10-CM

## 2019-04-08 PROCEDURE — 88184 FLOWCYTOMETRY/ TC 1 MARKER: CPT | Performed by: FAMILY MEDICINE

## 2019-04-08 PROCEDURE — 36415 COLL VENOUS BLD VENIPUNCTURE: CPT | Performed by: FAMILY MEDICINE

## 2019-04-12 ENCOUNTER — NURSE ONLY (OUTPATIENT)
Dept: FAMILY MEDICINE CLINIC | Facility: CLINIC | Age: 54
End: 2019-04-12
Payer: COMMERCIAL

## 2019-04-12 RX ORDER — LOSARTAN POTASSIUM AND HYDROCHLOROTHIAZIDE 12.5; 1 MG/1; MG/1
1 TABLET ORAL DAILY
Qty: 90 TABLET | Refills: 0 | Status: SHIPPED | OUTPATIENT
Start: 2019-04-12 | End: 2019-06-25

## 2019-04-12 NOTE — PROGRESS NOTES
Patient here for BP check. BP-129/78, P-72. Patient taking:  Hydrochlorothiazide 12.5mg daily. Losartan 100 mg daily. Dr. Viva Seip notified. Patient can try Losartan/Hydrochlorothiazide 100/12.5 mg daily   Patient notified, agreed with plan.   Order e

## 2019-04-16 ENCOUNTER — TELEPHONE (OUTPATIENT)
Dept: FAMILY MEDICINE CLINIC | Facility: CLINIC | Age: 54
End: 2019-04-16

## 2019-04-16 NOTE — TELEPHONE ENCOUNTER
See flow cytometry result notes from dr Chaya Martinez today at 110 pm-resulted today at 1215 pm.  See lab result note for call back to pt.

## 2019-04-18 DIAGNOSIS — I10 ESSENTIAL HYPERTENSION: ICD-10-CM

## 2019-04-18 RX ORDER — HYDROCHLOROTHIAZIDE 12.5 MG/1
CAPSULE, GELATIN COATED ORAL
Qty: 30 CAPSULE | Refills: 5 | Status: SHIPPED | OUTPATIENT
Start: 2019-04-18 | End: 2019-09-25

## 2019-06-26 RX ORDER — LOSARTAN POTASSIUM AND HYDROCHLOROTHIAZIDE 12.5; 1 MG/1; MG/1
TABLET ORAL
Qty: 90 TABLET | Refills: 0 | Status: SHIPPED | OUTPATIENT
Start: 2019-06-26 | End: 2019-09-24

## 2019-09-23 RX ORDER — SIMVASTATIN 10 MG
TABLET ORAL
Qty: 90 TABLET | Refills: 0 | Status: SHIPPED | OUTPATIENT
Start: 2019-09-23 | End: 2019-12-23

## 2019-09-23 NOTE — TELEPHONE ENCOUNTER
I have sent rx but he is due for appt with Dr Brent Barcenas     Return in about 6 months (around 9/8/2019) for Hypertension, Hypercholesterolemia  Please call him thanks.

## 2019-09-25 RX ORDER — LOSARTAN POTASSIUM AND HYDROCHLOROTHIAZIDE 12.5; 1 MG/1; MG/1
TABLET ORAL
Qty: 90 TABLET | Refills: 0 | Status: SHIPPED | OUTPATIENT
Start: 2019-09-25 | End: 2019-12-26

## 2019-10-15 ENCOUNTER — OFFICE VISIT (OUTPATIENT)
Dept: FAMILY MEDICINE CLINIC | Facility: CLINIC | Age: 54
End: 2019-10-15
Payer: COMMERCIAL

## 2019-10-15 VITALS
HEART RATE: 72 BPM | BODY MASS INDEX: 31.92 KG/M2 | RESPIRATION RATE: 12 BRPM | SYSTOLIC BLOOD PRESSURE: 126 MMHG | WEIGHT: 223 LBS | DIASTOLIC BLOOD PRESSURE: 80 MMHG | TEMPERATURE: 98 F | HEIGHT: 70 IN

## 2019-10-15 DIAGNOSIS — E78.00 HYPERCHOLESTEREMIA: ICD-10-CM

## 2019-10-15 DIAGNOSIS — I10 ESSENTIAL HYPERTENSION: Primary | ICD-10-CM

## 2019-10-15 DIAGNOSIS — Z12.5 SCREENING FOR PROSTATE CANCER: ICD-10-CM

## 2019-10-15 DIAGNOSIS — Z23 NEED FOR VACCINATION: ICD-10-CM

## 2019-10-15 PROCEDURE — 99214 OFFICE O/P EST MOD 30 MIN: CPT | Performed by: FAMILY MEDICINE

## 2019-10-15 PROCEDURE — 90471 IMMUNIZATION ADMIN: CPT | Performed by: FAMILY MEDICINE

## 2019-10-15 PROCEDURE — 90686 IIV4 VACC NO PRSV 0.5 ML IM: CPT | Performed by: FAMILY MEDICINE

## 2019-10-15 NOTE — PROGRESS NOTES
HPI:   Milton Lopez is a 62year old male who presents for recheck of hyperlipidemia. Patient reports taking medications as instructed, no medication side effects noted. Denies any generalized muscle aches.  Patient presents for recheck of his hypertension tablet, Rfl: 0  SIMVASTATIN 10 MG Oral Tab, TAKE 1 TABLET NIGHTLY, Disp: 90 tablet, Rfl: 0  Omeprazole 40 MG Oral Capsule Delayed Release, Take 40 mg by mouth daily. , Disp: , Rfl:   Ergocalciferol (VITAMIN D OR), Take 4,000 Int'l Units by mouth daily. , Dis rashes  RESPIRATORY: denies shortness of breath with exertion  CARDIOVASCULAR: denies chest pain on exertion  GI: denies abdominal pain and denies heartburn  NEURO: denies headaches    EXAM:   /80   Pulse 72   Temp 98 °F (36.7 °C)   Resp 12   Ht 70\"

## 2019-10-15 NOTE — PROGRESS NOTES
HPI:   Yolie Lugo is a 62year old male who presents for recheck of hyperlipidemia. Patient reports taking medications as instructed, no medication side effects noted. Denies any generalized muscle aches.  Patient presents for recheck of his hypertension Tab, TAKE 1 TABLET NIGHTLY, Disp: 90 tablet, Rfl: 0  Omeprazole 40 MG Oral Capsule Delayed Release, Take 40 mg by mouth daily. , Disp: , Rfl:   Ergocalciferol (VITAMIN D OR), Take 4,000 Int'l Units by mouth daily. , Disp: , Rfl:   Coenzyme Q10 (COQ-10 OR), T with exertion  CARDIOVASCULAR: denies chest pain on exertion  GI: denies abdominal pain and denies heartburn  NEURO: denies headaches    EXAM:   /80   Pulse 72   Temp 98 °F (36.7 °C)   Resp 12   Ht 70\"   Wt 223 lb (101.2 kg)   BMI 32.00 kg/m²    Wt

## 2019-11-15 ENCOUNTER — LAB ENCOUNTER (OUTPATIENT)
Dept: LAB | Age: 54
End: 2019-11-15
Attending: FAMILY MEDICINE
Payer: COMMERCIAL

## 2019-11-15 DIAGNOSIS — Z12.5 SCREENING FOR PROSTATE CANCER: ICD-10-CM

## 2019-11-15 DIAGNOSIS — E78.00 HYPERCHOLESTEREMIA: ICD-10-CM

## 2019-11-15 DIAGNOSIS — I10 ESSENTIAL HYPERTENSION: ICD-10-CM

## 2019-11-15 PROCEDURE — 85025 COMPLETE CBC W/AUTO DIFF WBC: CPT | Performed by: FAMILY MEDICINE

## 2019-11-15 PROCEDURE — 80061 LIPID PANEL: CPT | Performed by: FAMILY MEDICINE

## 2019-11-15 PROCEDURE — 80053 COMPREHEN METABOLIC PANEL: CPT | Performed by: FAMILY MEDICINE

## 2019-11-15 PROCEDURE — 84153 ASSAY OF PSA TOTAL: CPT | Performed by: FAMILY MEDICINE

## 2019-11-15 PROCEDURE — 36415 COLL VENOUS BLD VENIPUNCTURE: CPT | Performed by: FAMILY MEDICINE

## 2019-11-18 DIAGNOSIS — E78.00 HYPERCHOLESTEREMIA: Primary | ICD-10-CM

## 2019-12-23 RX ORDER — SIMVASTATIN 10 MG
TABLET ORAL
Qty: 90 TABLET | Refills: 1 | Status: SHIPPED | OUTPATIENT
Start: 2019-12-23 | End: 2020-06-22

## 2019-12-26 RX ORDER — LOSARTAN POTASSIUM AND HYDROCHLOROTHIAZIDE 12.5; 1 MG/1; MG/1
TABLET ORAL
Qty: 90 TABLET | Refills: 1 | Status: SHIPPED | OUTPATIENT
Start: 2019-12-26 | End: 2020-06-23

## 2020-02-05 ENCOUNTER — HOSPITAL ENCOUNTER (OUTPATIENT)
Dept: CT IMAGING | Facility: HOSPITAL | Age: 55
Discharge: HOME | End: 2020-02-05
Attending: FAMILY MEDICINE

## 2020-02-05 DIAGNOSIS — Z13.6 SCREENING FOR CARDIOVASCULAR CONDITION: ICD-10-CM

## 2020-02-12 ENCOUNTER — TELEPHONE (OUTPATIENT)
Dept: CARDIAC REHAB | Facility: HOSPITAL | Age: 55
End: 2020-02-12

## 2020-06-22 RX ORDER — SIMVASTATIN 10 MG
TABLET ORAL
Qty: 90 TABLET | Refills: 1 | Status: SHIPPED | OUTPATIENT
Start: 2020-06-22 | End: 2020-12-21

## 2020-06-23 RX ORDER — LOSARTAN POTASSIUM AND HYDROCHLOROTHIAZIDE 12.5; 1 MG/1; MG/1
TABLET ORAL
Qty: 90 TABLET | Refills: 0 | Status: SHIPPED | OUTPATIENT
Start: 2020-06-23 | End: 2020-09-21

## 2020-06-23 NOTE — TELEPHONE ENCOUNTER
LOSARTAN/HCTZ TABS 100/12. 5MG    Hypertension Medications Protocol Failed    The pt is past due for an appt. Please see pended medications. Please sign if appropriate.       Thank you      Last OV: 10/15/2019

## 2020-08-24 ENCOUNTER — OFFICE VISIT (OUTPATIENT)
Dept: FAMILY MEDICINE CLINIC | Facility: CLINIC | Age: 55
End: 2020-08-24
Payer: COMMERCIAL

## 2020-08-24 VITALS
HEIGHT: 68.5 IN | HEART RATE: 64 BPM | SYSTOLIC BLOOD PRESSURE: 132 MMHG | TEMPERATURE: 97 F | WEIGHT: 236 LBS | BODY MASS INDEX: 35.36 KG/M2 | RESPIRATION RATE: 16 BRPM | DIASTOLIC BLOOD PRESSURE: 92 MMHG

## 2020-08-24 DIAGNOSIS — E78.00 HYPERCHOLESTEREMIA: ICD-10-CM

## 2020-08-24 DIAGNOSIS — Z00.00 ANNUAL PHYSICAL EXAM: Primary | ICD-10-CM

## 2020-08-24 DIAGNOSIS — I10 ESSENTIAL HYPERTENSION: ICD-10-CM

## 2020-08-24 DIAGNOSIS — N43.3 RIGHT HYDROCELE: ICD-10-CM

## 2020-08-24 DIAGNOSIS — E66.9 OBESITY (BMI 35.0-39.9 WITHOUT COMORBIDITY): ICD-10-CM

## 2020-08-24 PROCEDURE — 3075F SYST BP GE 130 - 139MM HG: CPT | Performed by: FAMILY MEDICINE

## 2020-08-24 PROCEDURE — 99396 PREV VISIT EST AGE 40-64: CPT | Performed by: FAMILY MEDICINE

## 2020-08-24 PROCEDURE — 3080F DIAST BP >= 90 MM HG: CPT | Performed by: FAMILY MEDICINE

## 2020-08-24 PROCEDURE — 3008F BODY MASS INDEX DOCD: CPT | Performed by: FAMILY MEDICINE

## 2020-08-24 NOTE — PROGRESS NOTES
Billie Waller is a 62year old male who presents for a complete physical exam.   HPI:   Pt states his HTN has been under good control. He denies chest pain or unusual SOB. Briggs Claude He takes simvastatin for his hypercholesterolemia without problem.   He has not bee 25   11/30/2012 25 08/26/2011 13        ALLERGIES:   Not on File    MEDICATIONS :     Current Outpatient Medications   Medication Sig Dispense Refill   • LOSARTAN POTASSIUM-HCTZ 100-12.5 MG Oral Tab TAKE 1 TABLET DAILY 90 tablet 0   • SIMVASTATIN 10 MG O Paternal Grandfather    • Heart Disease Paternal Grandmother        SOCIAL HISTORY   Social History    Tobacco Use      Smoking status: Never Smoker      Smokeless tobacco: Never Used    Alcohol use: Yes      Comment: 2 drinks/wk    Drug use: No     Occ: L edema  NEURO: Oriented times three,cranial nerves are intact,motor and sensory are grossly intact      ASSESSMENT :     Annual physical exam  (primary encounter diagnosis)  Right hydrocele  Essential hypertension  Hypercholesteremia  Obesity (bmi 35.0-39. 9

## 2020-09-04 ENCOUNTER — LAB ENCOUNTER (OUTPATIENT)
Dept: LAB | Age: 55
End: 2020-09-04
Attending: FAMILY MEDICINE
Payer: COMMERCIAL

## 2020-09-04 DIAGNOSIS — E78.00 HYPERCHOLESTEREMIA: ICD-10-CM

## 2020-09-04 LAB
ALBUMIN SERPL-MCNC: 3.8 G/DL (ref 3.4–5)
ALBUMIN/GLOB SERPL: 1.3 {RATIO} (ref 1–2)
ALP LIVER SERPL-CCNC: 56 U/L (ref 45–117)
ALT SERPL-CCNC: 18 U/L (ref 16–61)
ANION GAP SERPL CALC-SCNC: 5 MMOL/L (ref 0–18)
AST SERPL-CCNC: 14 U/L (ref 15–37)
BILIRUB SERPL-MCNC: 0.6 MG/DL (ref 0.1–2)
BUN BLD-MCNC: 21 MG/DL (ref 7–18)
BUN/CREAT SERPL: 22.1 (ref 10–20)
CALCIUM BLD-MCNC: 9.5 MG/DL (ref 8.5–10.1)
CHLORIDE SERPL-SCNC: 107 MMOL/L (ref 98–112)
CHOLEST SMN-MCNC: 158 MG/DL (ref ?–200)
CO2 SERPL-SCNC: 27 MMOL/L (ref 21–32)
CREAT BLD-MCNC: 0.95 MG/DL (ref 0.7–1.3)
GLOBULIN PLAS-MCNC: 2.9 G/DL (ref 2.8–4.4)
GLUCOSE BLD-MCNC: 103 MG/DL (ref 70–99)
HDLC SERPL-MCNC: 54 MG/DL (ref 40–59)
LDLC SERPL CALC-MCNC: 87 MG/DL (ref ?–100)
M PROTEIN MFR SERPL ELPH: 6.7 G/DL (ref 6.4–8.2)
NONHDLC SERPL-MCNC: 104 MG/DL (ref ?–130)
OSMOLALITY SERPL CALC.SUM OF ELEC: 291 MOSM/KG (ref 275–295)
PATIENT FASTING Y/N/NP: YES
PATIENT FASTING Y/N/NP: YES
POTASSIUM SERPL-SCNC: 4.7 MMOL/L (ref 3.5–5.1)
SODIUM SERPL-SCNC: 139 MMOL/L (ref 136–145)
TRIGL SERPL-MCNC: 85 MG/DL (ref 30–149)
VLDLC SERPL CALC-MCNC: 17 MG/DL (ref 0–30)

## 2020-09-04 PROCEDURE — 36415 COLL VENOUS BLD VENIPUNCTURE: CPT | Performed by: FAMILY MEDICINE

## 2020-09-04 PROCEDURE — 80053 COMPREHEN METABOLIC PANEL: CPT | Performed by: FAMILY MEDICINE

## 2020-09-04 PROCEDURE — 80061 LIPID PANEL: CPT | Performed by: FAMILY MEDICINE

## 2020-09-08 DIAGNOSIS — R73.01 ELEVATED FASTING GLUCOSE: Primary | ICD-10-CM

## 2020-09-08 DIAGNOSIS — E78.00 HYPERCHOLESTEROLEMIA: ICD-10-CM

## 2020-09-08 DIAGNOSIS — I10 HYPERTENSION, UNSPECIFIED TYPE: ICD-10-CM

## 2020-09-08 DIAGNOSIS — Z12.5 SCREENING FOR PROSTATE CANCER: ICD-10-CM

## 2020-09-21 RX ORDER — LOSARTAN POTASSIUM AND HYDROCHLOROTHIAZIDE 12.5; 1 MG/1; MG/1
TABLET ORAL
Qty: 90 TABLET | Refills: 1 | Status: SHIPPED | OUTPATIENT
Start: 2020-09-21 | End: 2021-03-23

## 2020-10-05 ENCOUNTER — HOSPITAL ENCOUNTER (OUTPATIENT)
Dept: ULTRASOUND IMAGING | Age: 55
Discharge: HOME | End: 2020-10-05
Attending: FAMILY MEDICINE
Payer: COMMERCIAL

## 2020-10-05 DIAGNOSIS — N43.3 RIGHT HYDROCELE: ICD-10-CM

## 2020-10-05 PROCEDURE — 93975 VASCULAR STUDY: CPT | Performed by: FAMILY MEDICINE

## 2020-10-05 PROCEDURE — 76870 US EXAM SCROTUM: CPT | Performed by: FAMILY MEDICINE

## 2020-10-06 ENCOUNTER — TELEPHONE (OUTPATIENT)
Dept: FAMILY MEDICINE CLINIC | Facility: CLINIC | Age: 55
End: 2020-10-06

## 2020-10-06 DIAGNOSIS — L72.9 CYST OF SCROTUM: Primary | ICD-10-CM

## 2020-10-26 NOTE — H&P
HPI:     Usman Thomson is a 60year old male with a PMH of depression, HTN, HL, GONZALO, GERD, herpes, UHR in 2010. He presents as a consult from Dr Jess Carrera office with right paratesticular lesion, presumed spermatocele.    Reports right sided scrotal sw (adult) (pediatric) Edwrad HST 3-14-17    AHI 14.7 supine AHI 21 snoring SaO2 dwight 83 % autoPAP 6-20 [facility]   • Other symptoms involving cardiovascular system    • Problems with swallowing 2015   • Screening for other and unspecified genitourinary condit HPI.    PHYSICAL EXAM:     GENERAL APPEARANCE: well, developed, well nourished, in no acute distress  NEUROLOGIC: nonfocal, alert and oriented  HEAD: normocephalic, atraumatic  EYES: sclera non-icteric  EARS: hearing intact  ORAL CAVITY: mucosa moist  NECK

## 2020-11-02 ENCOUNTER — TELEPHONE (OUTPATIENT)
Dept: SURGERY | Facility: CLINIC | Age: 55
End: 2020-11-02

## 2020-11-02 ENCOUNTER — OFFICE VISIT (OUTPATIENT)
Dept: SURGERY | Facility: CLINIC | Age: 55
End: 2020-11-02
Payer: COMMERCIAL

## 2020-11-02 VITALS — HEART RATE: 70 BPM | DIASTOLIC BLOOD PRESSURE: 96 MMHG | SYSTOLIC BLOOD PRESSURE: 159 MMHG

## 2020-11-02 DIAGNOSIS — N50.89 SCROTAL SWELLING: ICD-10-CM

## 2020-11-02 DIAGNOSIS — N50.3 EPIDIDYMAL CYST: Primary | ICD-10-CM

## 2020-11-02 DIAGNOSIS — N43.40 SPERMATOCELE OF EPIDIDYMIS: ICD-10-CM

## 2020-11-02 DIAGNOSIS — N43.40 SPERMATOCELE: Primary | ICD-10-CM

## 2020-11-02 PROCEDURE — 99072 ADDL SUPL MATRL&STAF TM PHE: CPT | Performed by: UROLOGY

## 2020-11-02 PROCEDURE — 81003 URINALYSIS AUTO W/O SCOPE: CPT | Performed by: UROLOGY

## 2020-11-02 PROCEDURE — 99244 OFF/OP CNSLTJ NEW/EST MOD 40: CPT | Performed by: UROLOGY

## 2020-11-02 PROCEDURE — 3080F DIAST BP >= 90 MM HG: CPT | Performed by: UROLOGY

## 2020-11-02 PROCEDURE — 3077F SYST BP >= 140 MM HG: CPT | Performed by: UROLOGY

## 2020-11-03 NOTE — TELEPHONE ENCOUNTER
Called patient this date. Left message for her to contact me when ready to proceed with surgery at [phone].

## 2020-11-03 NOTE — TELEPHONE ENCOUNTER
Marisa Notice,  Could you please schedule this patient for surgery? Of note, he's getting a CT pelvis prior and he may want to think about things prior to scheduling surgery but I said you would reach out to provide contact info. Thank you!     Singh Godinez

## 2020-11-05 NOTE — TELEPHONE ENCOUNTER
Spoke with patient and Right spermatocelectomy and epididymectomy scheduled at [facility] on 12/3/20. Pre op instructions reviewed. Verbalized understanding. Post op appt made for 12/16/20.

## 2020-11-06 ENCOUNTER — TELEPHONE (OUTPATIENT)
Dept: SURGERY | Facility: CLINIC | Age: 55
End: 2020-11-06

## 2020-11-06 DIAGNOSIS — N43.40 SPERMATOCELE: Primary | ICD-10-CM

## 2020-11-14 ENCOUNTER — HOSPITAL ENCOUNTER (OUTPATIENT)
Dept: CT IMAGING | Age: 55
Discharge: HOME | End: 2020-11-14
Attending: UROLOGY
Payer: COMMERCIAL

## 2020-11-14 DIAGNOSIS — N50.89 SCROTAL SWELLING: ICD-10-CM

## 2020-11-14 PROCEDURE — 72192 CT PELVIS W/O DYE: CPT | Performed by: UROLOGY

## 2020-11-16 NOTE — PROGRESS NOTES
Pearl Borja,  I have reviewed your test results and left you a voicemail. CT shows no evidence of hernia. OK to proceed as planned for right spermatocelectomy in a few weeks. If you have any questions in the meantime please give our office a call.   Thanks,  Mi

## 2020-11-30 ENCOUNTER — APPOINTMENT (OUTPATIENT)
Dept: LAB | Facility: HOSPITAL | Age: 55
End: 2020-11-30
Attending: UROLOGY
Payer: COMMERCIAL

## 2020-11-30 DIAGNOSIS — N43.40 SPERMATOCELE: ICD-10-CM

## 2020-11-30 PROCEDURE — 93005 ELECTROCARDIOGRAM TRACING: CPT

## 2020-11-30 PROCEDURE — 93010 ELECTROCARDIOGRAM REPORT: CPT | Performed by: INTERNAL MEDICINE

## 2020-12-02 ENCOUNTER — TELEPHONE (OUTPATIENT)
Dept: SURGERY | Facility: CLINIC | Age: 55
End: 2020-12-02

## 2020-12-02 ENCOUNTER — OFFICE VISIT (OUTPATIENT)
Dept: FAMILY MEDICINE CLINIC | Facility: CLINIC | Age: 55
End: 2020-12-02
Payer: COMMERCIAL

## 2020-12-02 VITALS
WEIGHT: 229 LBS | HEIGHT: 70 IN | TEMPERATURE: 98 F | SYSTOLIC BLOOD PRESSURE: 128 MMHG | DIASTOLIC BLOOD PRESSURE: 80 MMHG | RESPIRATION RATE: 16 BRPM | BODY MASS INDEX: 32.78 KG/M2 | HEART RATE: 84 BPM

## 2020-12-02 DIAGNOSIS — N43.40 SPERMATOCELE: ICD-10-CM

## 2020-12-02 DIAGNOSIS — E78.00 HYPERCHOLESTEREMIA: ICD-10-CM

## 2020-12-02 DIAGNOSIS — Z20.822 ENCOUNTER FOR PREPROCEDURE SCREENING LABORATORY TESTING FOR COVID-19: ICD-10-CM

## 2020-12-02 DIAGNOSIS — I10 ESSENTIAL HYPERTENSION: ICD-10-CM

## 2020-12-02 DIAGNOSIS — Z01.812 ENCOUNTER FOR PREPROCEDURE SCREENING LABORATORY TESTING FOR COVID-19: ICD-10-CM

## 2020-12-02 DIAGNOSIS — G47.33 OSA (OBSTRUCTIVE SLEEP APNEA): ICD-10-CM

## 2020-12-02 DIAGNOSIS — Z01.818 PRE-OPERATIVE CLEARANCE: Primary | ICD-10-CM

## 2020-12-02 DIAGNOSIS — N43.40 SPERMATOCELE: Primary | ICD-10-CM

## 2020-12-02 PROCEDURE — 93000 ELECTROCARDIOGRAM COMPLETE: CPT | Performed by: NURSE PRACTITIONER

## 2020-12-02 PROCEDURE — 3079F DIAST BP 80-89 MM HG: CPT | Performed by: NURSE PRACTITIONER

## 2020-12-02 PROCEDURE — 99244 OFF/OP CNSLTJ NEW/EST MOD 40: CPT | Performed by: NURSE PRACTITIONER

## 2020-12-02 PROCEDURE — 3008F BODY MASS INDEX DOCD: CPT | Performed by: NURSE PRACTITIONER

## 2020-12-02 PROCEDURE — 3074F SYST BP LT 130 MM HG: CPT | Performed by: NURSE PRACTITIONER

## 2020-12-02 RX ORDER — HYDROCHLOROTHIAZIDE 12.5 MG/1
12.5 CAPSULE, GELATIN COATED ORAL DAILY
Qty: 30 CAPSULE | Refills: 1 | Status: SHIPPED | OUTPATIENT
Start: 2020-12-02 | End: 2020-12-02

## 2020-12-02 NOTE — TELEPHONE ENCOUNTER
SUKHWINDER for Dr Titi Shoemaker. Surgery has been cancelled for tomorrow 12/3/20 due to abnormal EKG     LM for patient that his surgery has been cancelled an to please call us back.

## 2020-12-02 NOTE — TELEPHONE ENCOUNTER
Per pt, EKG is abnormal and he is not cleared by cardiac doctor. Surgery is scheduled for 12/3.  Please advise

## 2020-12-02 NOTE — H&P
Terry Tay is a 60year old male who presents for a pre-operative physical exam. Patient is to have right spermatocelectomy and right epididymectomy, to be done by Dr. Panda Jeffery at [facility] on [facility].     Patient had abnormal EKG completed an • Depressive disorder, not elsewhere classified    • Diverticulitis    • Herpes zoster without mention of complication    • HIGH BLOOD PRESSURE    • HIGH CHOLESTEROL    • Iron deficiency anemia, unspecified    • Obstructive sleep apnea (adult) (pediatric shortness of breath with exertion  CARDIOVASCULAR: denies chest pain on exertion  GI: denies abdominal pain,denies heartburn  : denies dysuria,nocturia or changes in stream  MUSCULOSKELETAL: denies back pain  NEURO: denies headaches  PSYCHE: denies depre STRESS TEST (CPT=93017/25012); Future    4. Hypercholesteremia  Stable. Continue medication as ordered. 5. GONZALO (obstructive sleep apnea)  Has not worn CPAP x 2 years. Did lose 40 lbs.   Discussed repeating sleep study given HTN, Obesity--patient agreea

## 2020-12-04 ENCOUNTER — MED REC SCAN ONLY (OUTPATIENT)
Dept: FAMILY MEDICINE CLINIC | Facility: CLINIC | Age: 55
End: 2020-12-04

## 2020-12-07 ENCOUNTER — TELEPHONE (OUTPATIENT)
Dept: SURGERY | Facility: CLINIC | Age: 55
End: 2020-12-07

## 2020-12-07 NOTE — TELEPHONE ENCOUNTER
RN called patient to update that since his surgery on 12/3 spermatocelectomy/ rt epididymectomy) was cancelled, RN also to cancel his follow up office visit with Dr Alphonse Smith on 12/16. Patient agreeable.      Note, patient's surgery was cancelled due to abnormal

## 2020-12-08 ENCOUNTER — LAB ENCOUNTER (OUTPATIENT)
Dept: LAB | Facility: HOSPITAL | Age: 55
End: 2020-12-08
Attending: NURSE PRACTITIONER
Payer: COMMERCIAL

## 2020-12-08 DIAGNOSIS — Z01.812 ENCOUNTER FOR PREPROCEDURE SCREENING LABORATORY TESTING FOR COVID-19: ICD-10-CM

## 2020-12-08 DIAGNOSIS — Z20.822 ENCOUNTER FOR PREPROCEDURE SCREENING LABORATORY TESTING FOR COVID-19: ICD-10-CM

## 2020-12-11 ENCOUNTER — HOSPITAL ENCOUNTER (OUTPATIENT)
Dept: CV DIAGNOSTICS | Facility: HOSPITAL | Age: 55
Discharge: HOME | End: 2020-12-11
Attending: NURSE PRACTITIONER
Payer: COMMERCIAL

## 2020-12-11 DIAGNOSIS — I10 ESSENTIAL HYPERTENSION: ICD-10-CM

## 2020-12-11 PROCEDURE — 78452 HT MUSCLE IMAGE SPECT MULT: CPT | Performed by: NURSE PRACTITIONER

## 2020-12-11 PROCEDURE — 93018 CV STRESS TEST I&R ONLY: CPT | Performed by: NURSE PRACTITIONER

## 2020-12-11 PROCEDURE — 93017 CV STRESS TEST TRACING ONLY: CPT | Performed by: NURSE PRACTITIONER

## 2020-12-16 DIAGNOSIS — R94.39 ABNORMAL FINDING ON CARDIOVASCULAR STRESS TEST: Primary | ICD-10-CM

## 2020-12-21 RX ORDER — SIMVASTATIN 10 MG
TABLET ORAL
Qty: 90 TABLET | Refills: 0 | Status: SHIPPED | OUTPATIENT
Start: 2020-12-21 | End: 2021-03-19

## 2021-03-19 RX ORDER — SIMVASTATIN 10 MG
TABLET ORAL
Qty: 90 TABLET | Refills: 0 | Status: SHIPPED | OUTPATIENT
Start: 2021-03-19 | End: 2021-04-13 | Stop reason: DRUGHIGH

## 2021-03-23 ENCOUNTER — OFFICE VISIT (OUTPATIENT)
Dept: FAMILY MEDICINE CLINIC | Facility: CLINIC | Age: 56
End: 2021-03-23
Payer: COMMERCIAL

## 2021-03-23 VITALS
HEART RATE: 80 BPM | BODY MASS INDEX: 34.65 KG/M2 | RESPIRATION RATE: 16 BRPM | DIASTOLIC BLOOD PRESSURE: 96 MMHG | SYSTOLIC BLOOD PRESSURE: 150 MMHG | TEMPERATURE: 98 F | WEIGHT: 242 LBS | HEIGHT: 70 IN

## 2021-03-23 DIAGNOSIS — G47.33 OSA (OBSTRUCTIVE SLEEP APNEA): ICD-10-CM

## 2021-03-23 DIAGNOSIS — I10 ESSENTIAL HYPERTENSION: Primary | ICD-10-CM

## 2021-03-23 DIAGNOSIS — E66.09 CLASS 1 OBESITY DUE TO EXCESS CALORIES WITHOUT SERIOUS COMORBIDITY WITH BODY MASS INDEX (BMI) OF 34.0 TO 34.9 IN ADULT: ICD-10-CM

## 2021-03-23 DIAGNOSIS — E78.00 HYPERCHOLESTEREMIA: ICD-10-CM

## 2021-03-23 PROCEDURE — 3080F DIAST BP >= 90 MM HG: CPT | Performed by: FAMILY MEDICINE

## 2021-03-23 PROCEDURE — 3077F SYST BP >= 140 MM HG: CPT | Performed by: FAMILY MEDICINE

## 2021-03-23 PROCEDURE — 3008F BODY MASS INDEX DOCD: CPT | Performed by: FAMILY MEDICINE

## 2021-03-23 PROCEDURE — 99214 OFFICE O/P EST MOD 30 MIN: CPT | Performed by: FAMILY MEDICINE

## 2021-03-23 RX ORDER — LOSARTAN POTASSIUM AND HYDROCHLOROTHIAZIDE 12.5; 1 MG/1; MG/1
1 TABLET ORAL DAILY
Qty: 90 TABLET | Refills: 1 | Status: SHIPPED | OUTPATIENT
Start: 2021-03-23 | End: 2021-09-16

## 2021-03-23 NOTE — PROGRESS NOTES
HPI:   Debby Millan is a 60year old male who presents for recheck of hyperlipidemia. Patient reports taking medications as instructed, no medication side effects noted. Denies any generalized muscle aches.  Patient presents for recheck of his hypertension mouth daily. 90 tablet 1   • SIMVASTATIN 10 MG Oral Tab TAKE 1 TABLET NIGHTLY 90 tablet 0   • Omeprazole 40 MG Oral Capsule Delayed Release Take 40 mg by mouth daily. • Ergocalciferol (VITAMIN D OR) Take 4,000 Int'l Units by mouth daily.      • Coenzyme Exercise: 3-4 times per week.   Diet: watches minimally     REVIEW OF SYSTEMS:   GENERAL HEALTH: feels well otherwise  SKIN: denies any unusual skin lesions or rashes  RESPIRATORY: denies shortness of breath with exertion  CARDIOVASCULAR: denies chest marshal

## 2021-03-24 RX ORDER — LOSARTAN POTASSIUM AND HYDROCHLOROTHIAZIDE 12.5; 1 MG/1; MG/1
TABLET ORAL
Qty: 90 TABLET | Refills: 3 | OUTPATIENT
Start: 2021-03-24

## 2021-04-12 ENCOUNTER — LAB ENCOUNTER (OUTPATIENT)
Dept: LAB | Age: 56
End: 2021-04-12
Attending: FAMILY MEDICINE
Payer: COMMERCIAL

## 2021-04-12 ENCOUNTER — PATIENT MESSAGE (OUTPATIENT)
Dept: FAMILY MEDICINE CLINIC | Facility: CLINIC | Age: 56
End: 2021-04-12

## 2021-04-12 DIAGNOSIS — I10 HYPERTENSION, UNSPECIFIED TYPE: ICD-10-CM

## 2021-04-12 DIAGNOSIS — Z12.5 SCREENING FOR PROSTATE CANCER: ICD-10-CM

## 2021-04-12 DIAGNOSIS — E78.00 HYPERCHOLESTEROLEMIA: ICD-10-CM

## 2021-04-12 DIAGNOSIS — R73.01 ELEVATED FASTING GLUCOSE: ICD-10-CM

## 2021-04-12 PROCEDURE — 80061 LIPID PANEL: CPT | Performed by: FAMILY MEDICINE

## 2021-04-12 PROCEDURE — 84153 ASSAY OF PSA TOTAL: CPT | Performed by: FAMILY MEDICINE

## 2021-04-12 PROCEDURE — 80053 COMPREHEN METABOLIC PANEL: CPT | Performed by: FAMILY MEDICINE

## 2021-04-12 PROCEDURE — 36415 COLL VENOUS BLD VENIPUNCTURE: CPT | Performed by: FAMILY MEDICINE

## 2021-04-12 PROCEDURE — 85025 COMPLETE CBC W/AUTO DIFF WBC: CPT | Performed by: FAMILY MEDICINE

## 2021-04-12 PROCEDURE — 83036 HEMOGLOBIN GLYCOSYLATED A1C: CPT | Performed by: FAMILY MEDICINE

## 2021-04-12 RX ORDER — HYDROCHLOROTHIAZIDE 12.5 MG/1
12.5 CAPSULE, GELATIN COATED ORAL DAILY
Qty: 30 CAPSULE | Refills: 0 | Status: SHIPPED | OUTPATIENT
Start: 2021-04-12 | End: 2021-05-06

## 2021-04-12 NOTE — TELEPHONE ENCOUNTER
Please have the patient increase his losartan-HCTZ to 100-25 mg and send me his blood pressure readings with the dates in 10 days

## 2021-04-12 NOTE — TELEPHONE ENCOUNTER
S/w pt. Advised of instructions. He voiced understanding. Reports he has a 90 day supply of his losart/hctz 100-12.5mg I offered to send a hydrochlorothiazide 12.5mg to local pharmacy and he could take one cap daily in addition the combo.  He is in agreemen

## 2021-04-12 NOTE — TELEPHONE ENCOUNTER
From: Yolie Lugo  To: Sima Redmond MD  Sent: 4/12/2021 9:11 AM CDT  Subject: Visit Follow-up Question    I was told to chart my bp fir a week.  The readings are as follows:  126/76  147/89  144/84  158/98  153/89  138/88  142/92  132/86    Akila chavarria

## 2021-04-13 DIAGNOSIS — E78.00 HYPERCHOLESTEREMIA: Primary | ICD-10-CM

## 2021-04-13 RX ORDER — SIMVASTATIN 10 MG
20 TABLET ORAL DAILY
Qty: 90 TABLET | Refills: 3 | COMMUNITY
Start: 2021-04-13 | End: 2021-05-27

## 2021-05-06 RX ORDER — HYDROCHLOROTHIAZIDE 12.5 MG/1
CAPSULE, GELATIN COATED ORAL
Qty: 90 CAPSULE | Refills: 0 | Status: SHIPPED | OUTPATIENT
Start: 2021-05-06 | End: 2021-06-28

## 2021-05-27 RX ORDER — SIMVASTATIN 10 MG
TABLET ORAL
Qty: 90 TABLET | Refills: 1 | Status: SHIPPED | OUTPATIENT
Start: 2021-05-27 | End: 2021-11-29

## 2021-06-28 ENCOUNTER — TELEPHONE (OUTPATIENT)
Dept: FAMILY MEDICINE CLINIC | Facility: CLINIC | Age: 56
End: 2021-06-28

## 2021-06-28 RX ORDER — HYDROCHLOROTHIAZIDE 12.5 MG/1
12.5 CAPSULE, GELATIN COATED ORAL DAILY
Qty: 90 CAPSULE | Refills: 0 | Status: SHIPPED | OUTPATIENT
Start: 2021-06-28 | End: 2021-09-08

## 2021-06-28 NOTE — TELEPHONE ENCOUNTER
LOV 03/21. Next OV is due in 09/2021. Pt needs refill sent to mail order pharmacy instead of the local pharmacy.

## 2021-08-12 ENCOUNTER — TELEPHONE (OUTPATIENT)
Dept: FAMILY MEDICINE CLINIC | Facility: CLINIC | Age: 56
End: 2021-08-12

## 2021-08-12 NOTE — TELEPHONE ENCOUNTER
I called and advised pt per Dr. Hubert Arias recommendation to go to the [facility] for eval. Pt. Agreed to plan and verbalized understanding

## 2021-08-12 NOTE — TELEPHONE ENCOUNTER
Pt has pain and soreness on inside right knee, for past 1 1/2 weeks. Pain today 7/10. No immediate appts available; please advise.

## 2021-08-12 NOTE — TELEPHONE ENCOUNTER
Pt has medial right knee pain for the past 7-10 days. NKI. It is getting worse. He has swelling and the pain is radiating from lateral knee  to the upper calf. No redness and it is not warm to the touch. Pain with weight bearing and with sleeping.  Difficul

## 2021-08-16 ENCOUNTER — APPOINTMENT (OUTPATIENT)
Dept: GENERAL RADIOLOGY | Age: 56
End: 2021-08-16
Attending: NURSE PRACTITIONER
Payer: COMMERCIAL

## 2021-08-16 ENCOUNTER — HOSPITAL ENCOUNTER (OUTPATIENT)
Age: 56
Discharge: HOME | End: 2021-08-16
Payer: COMMERCIAL

## 2021-08-16 VITALS
OXYGEN SATURATION: 97 % | TEMPERATURE: 97 F | RESPIRATION RATE: 16 BRPM | DIASTOLIC BLOOD PRESSURE: 85 MMHG | SYSTOLIC BLOOD PRESSURE: 140 MMHG | HEART RATE: 70 BPM

## 2021-08-16 DIAGNOSIS — S86.911A KNEE STRAIN, RIGHT, INITIAL ENCOUNTER: ICD-10-CM

## 2021-08-16 DIAGNOSIS — S89.90XA KNEE INJURY: Primary | ICD-10-CM

## 2021-08-16 PROCEDURE — 99213 OFFICE O/P EST LOW 20 MIN: CPT | Performed by: NURSE PRACTITIONER

## 2021-08-16 PROCEDURE — 73560 X-RAY EXAM OF KNEE 1 OR 2: CPT | Performed by: NURSE PRACTITIONER

## 2021-08-16 PROCEDURE — A6449 LT COMPRES BAND >=3" <5"/YD: HCPCS | Performed by: NURSE PRACTITIONER

## 2021-08-16 NOTE — ED PROVIDER NOTES
Patient Seen in: [facility]      History   Patient presents with:  Leg or Foot Injury    Stated Complaint: knee pain    HPI/Subjective:   57-year-old male reports to the immediate care for right knee pain.   Patient has a total left knee replac Comment: 2 drinks/wk    Drug use: No             Review of Systems   Constitutional: Negative. Respiratory: Negative. Cardiovascular: Negative. Gastrointestinal: Negative. Musculoskeletal: Positive for arthralgias. Skin: Negative.     Neurolog changes are present with medial compartment space narrowing and osteophyte formation. CONCLUSION:  See above.    Dictated by (CST): Tommie Robledo MD on 8/16/2021 at 9:46 AM     Finalized by (CST): Tommie Robledo MD on 8/16/2021 at 9:48 AM

## 2021-08-18 PROBLEM — M17.11 PRIMARY OSTEOARTHRITIS OF RIGHT KNEE: Status: ACTIVE | Noted: 2021-08-18

## 2021-08-26 ENCOUNTER — OFFICE VISIT (OUTPATIENT)
Dept: FAMILY MEDICINE CLINIC | Facility: CLINIC | Age: 56
End: 2021-08-26
Payer: COMMERCIAL

## 2021-08-26 VITALS
HEART RATE: 80 BPM | DIASTOLIC BLOOD PRESSURE: 72 MMHG | TEMPERATURE: 98 F | SYSTOLIC BLOOD PRESSURE: 138 MMHG | HEIGHT: 68 IN | BODY MASS INDEX: 35.77 KG/M2 | RESPIRATION RATE: 16 BRPM | WEIGHT: 236 LBS

## 2021-08-26 DIAGNOSIS — N43.40 SPERMATOCELE: ICD-10-CM

## 2021-08-26 DIAGNOSIS — I10 ESSENTIAL HYPERTENSION: ICD-10-CM

## 2021-08-26 DIAGNOSIS — E78.00 HYPERCHOLESTEREMIA: ICD-10-CM

## 2021-08-26 DIAGNOSIS — G47.33 OSA (OBSTRUCTIVE SLEEP APNEA): ICD-10-CM

## 2021-08-26 DIAGNOSIS — Z00.00 ANNUAL PHYSICAL EXAM: Primary | ICD-10-CM

## 2021-08-26 PROCEDURE — 99396 PREV VISIT EST AGE 40-64: CPT | Performed by: FAMILY MEDICINE

## 2021-08-26 PROCEDURE — 3008F BODY MASS INDEX DOCD: CPT | Performed by: FAMILY MEDICINE

## 2021-08-26 PROCEDURE — 3075F SYST BP GE 130 - 139MM HG: CPT | Performed by: FAMILY MEDICINE

## 2021-08-26 PROCEDURE — 3078F DIAST BP <80 MM HG: CPT | Performed by: FAMILY MEDICINE

## 2021-08-26 RX ORDER — HYDROCORTISONE ACETATE 0.5 %
CREAM (GRAM) TOPICAL
COMMUNITY
Start: 2021-08-09

## 2021-08-26 NOTE — PROGRESS NOTES
Milton Lopez is a 60year old male who presents for a complete physical exam.   HPI:   Pt states his HTN has been under good control. He denies chest pain or unusual SOB. Slater August He takes simvastatin for his hypercholesterolemia without problem.   He has not bee Allergies    MEDICATIONS :     Current Outpatient Medications   Medication Sig Dispense Refill   • Glucosamine-Chondroit-Vit C-Mn (GLUCOSAMINE 1500 COMPLEX) Oral Cap      • hydrochlorothiazide 12.5 MG Oral Cap Take 1 capsule (12.5 mg total) by mouth daily. History   Problem Relation Age of Onset   • Stroke Father    • Other (alzheimer's disease) Maternal Grandmother    • Stroke Paternal Grandfather    • Heart Disease Paternal Grandmother        SOCIAL HISTORY   Social History    Tobacco Use      Smoking stat two descended testes, cystic mass above the right testicle,no hernia,no penile lesions  RECTAL: good rectal tone, prostate shows no masses, stool is OB negative  MUSCULOSKELETAL: back is not tender, no joint swelling noted  EXTREMITIES: no cyanosis, clubbi

## 2021-09-08 RX ORDER — HYDROCHLOROTHIAZIDE 12.5 MG/1
12.5 CAPSULE, GELATIN COATED ORAL EVERY MORNING
Qty: 90 CAPSULE | Refills: 0 | Status: SHIPPED | OUTPATIENT
Start: 2021-09-08 | End: 2021-10-05

## 2021-09-16 DIAGNOSIS — I10 ESSENTIAL HYPERTENSION: ICD-10-CM

## 2021-09-16 RX ORDER — LOSARTAN POTASSIUM AND HYDROCHLOROTHIAZIDE 12.5; 1 MG/1; MG/1
1 TABLET ORAL DAILY
Qty: 90 TABLET | Refills: 1 | Status: SHIPPED | OUTPATIENT
Start: 2021-09-16 | End: 2021-11-30

## 2021-10-05 RX ORDER — HYDROCHLOROTHIAZIDE 12.5 MG/1
CAPSULE, GELATIN COATED ORAL
Qty: 30 CAPSULE | Refills: 2 | Status: SHIPPED | OUTPATIENT
Start: 2021-10-05 | End: 2021-11-30

## 2021-11-16 ENCOUNTER — TELEPHONE (OUTPATIENT)
Dept: FAMILY MEDICINE CLINIC | Facility: CLINIC | Age: 56
End: 2021-11-16

## 2021-11-16 NOTE — TELEPHONE ENCOUNTER
Medical Records Request received from [facility] for records from past 5 years. Release original sent to [employer] on 11/16/2021. Copy sent to scanning.

## 2021-11-29 RX ORDER — SIMVASTATIN 10 MG
10 TABLET ORAL NIGHTLY
Qty: 90 TABLET | Refills: 1 | Status: SHIPPED | OUTPATIENT
Start: 2021-11-29

## 2021-11-29 RX ORDER — SIMVASTATIN 10 MG
TABLET ORAL
Qty: 90 TABLET | Refills: 3 | OUTPATIENT
Start: 2021-11-29

## 2021-11-29 NOTE — PROGRESS NOTES
LOV 8/26/2021 NOV 02/26/2022    * Simvastatin 10mg   *Sig take 1 tab po at bedtime   * Last filled 05/27/2021

## 2021-11-30 DIAGNOSIS — I10 ESSENTIAL HYPERTENSION: ICD-10-CM

## 2021-11-30 RX ORDER — SIMVASTATIN 10 MG
10 TABLET ORAL NIGHTLY
Qty: 90 TABLET | Refills: 1 | Status: CANCELLED | OUTPATIENT
Start: 2021-11-30

## 2021-12-02 RX ORDER — LOSARTAN POTASSIUM AND HYDROCHLOROTHIAZIDE 12.5; 1 MG/1; MG/1
1 TABLET ORAL DAILY
Qty: 90 TABLET | Refills: 2 | Status: SHIPPED | OUTPATIENT
Start: 2021-12-02

## 2021-12-02 RX ORDER — HYDROCHLOROTHIAZIDE 12.5 MG/1
12.5 CAPSULE, GELATIN COATED ORAL DAILY
Qty: 90 CAPSULE | Refills: 2 | Status: SHIPPED | OUTPATIENT
Start: 2021-12-02

## 2021-12-02 NOTE — TELEPHONE ENCOUNTER
Lov 8/26/2021 Nov 02/26/2022     * Simvastatin 10mg, Take 1 tab po daily- Pt request refill was approved on 11/29/2021    * Hydrochlorothiazide 12.5mg     * Losartan potassium-hydrochlorothiazide 100-12.5mg

## 2022-02-25 RX ORDER — SIMVASTATIN 10 MG
10 TABLET ORAL NIGHTLY
Qty: 90 TABLET | Refills: 0 | Status: SHIPPED | OUTPATIENT
Start: 2022-02-25 | End: 2022-05-09

## 2022-04-11 ENCOUNTER — HOSPITAL ENCOUNTER (OUTPATIENT)
Dept: GENERAL RADIOLOGY | Age: 57
Discharge: HOME | End: 2022-04-11
Attending: STUDENT IN AN ORGANIZED HEALTH CARE EDUCATION/TRAINING PROGRAM
Payer: COMMERCIAL

## 2022-04-11 ENCOUNTER — OFFICE VISIT (OUTPATIENT)
Dept: FAMILY MEDICINE CLINIC | Facility: CLINIC | Age: 57
End: 2022-04-11
Payer: COMMERCIAL

## 2022-04-11 VITALS — HEIGHT: 68 IN | BODY MASS INDEX: 37.28 KG/M2 | WEIGHT: 246 LBS
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
## 2022-04-11 DIAGNOSIS — S46.002A INJURY OF LEFT ROTATOR CUFF, INITIAL ENCOUNTER: ICD-10-CM

## 2022-04-11 DIAGNOSIS — M25.512 ACUTE PAIN OF LEFT SHOULDER: Primary | ICD-10-CM

## 2022-04-11 DIAGNOSIS — M25.512 ACUTE PAIN OF LEFT SHOULDER: ICD-10-CM

## 2022-04-11 PROCEDURE — 99214 OFFICE O/P EST MOD 30 MIN: CPT | Performed by: STUDENT IN AN ORGANIZED HEALTH CARE EDUCATION/TRAINING PROGRAM

## 2022-04-11 PROCEDURE — 3008F BODY MASS INDEX DOCD: CPT | Performed by: STUDENT IN AN ORGANIZED HEALTH CARE EDUCATION/TRAINING PROGRAM

## 2022-04-11 PROCEDURE — 73030 X-RAY EXAM OF SHOULDER: CPT | Performed by: STUDENT IN AN ORGANIZED HEALTH CARE EDUCATION/TRAINING PROGRAM

## 2022-05-09 RX ORDER — SIMVASTATIN 10 MG
TABLET ORAL
Qty: 90 TABLET | Refills: 0 | Status: SHIPPED | OUTPATIENT
Start: 2022-05-09

## 2022-05-17 ENCOUNTER — LAB ENCOUNTER (OUTPATIENT)
Dept: LAB | Age: 57
End: 2022-05-17
Attending: FAMILY MEDICINE
Payer: COMMERCIAL

## 2022-05-17 DIAGNOSIS — E78.00 HYPERCHOLESTEREMIA: ICD-10-CM

## 2022-05-17 LAB
ALBUMIN SERPL-MCNC: 4.1 G/DL (ref 3.4–5)
ALBUMIN/GLOB SERPL: 1.4 {RATIO} (ref 1–2)
ALP LIVER SERPL-CCNC: 59 U/L
ALT SERPL-CCNC: 24 U/L
ANION GAP SERPL CALC-SCNC: 4 MMOL/L (ref 0–18)
AST SERPL-CCNC: 18 U/L (ref 15–37)
BILIRUB SERPL-MCNC: 0.7 MG/DL (ref 0.1–2)
BUN BLD-MCNC: 18 MG/DL (ref 7–18)
CALCIUM BLD-MCNC: 9.5 MG/DL (ref 8.5–10.1)
CHLORIDE SERPL-SCNC: 107 MMOL/L (ref 98–112)
CHOLEST SERPL-MCNC: 166 MG/DL (ref ?–200)
CO2 SERPL-SCNC: 29 MMOL/L (ref 21–32)
CREAT BLD-MCNC: 1.05 MG/DL
FASTING PATIENT LIPID ANSWER: YES
FASTING STATUS PATIENT QL REPORTED: YES
GLOBULIN PLAS-MCNC: 2.9 G/DL (ref 2.8–4.4)
GLUCOSE BLD-MCNC: 103 MG/DL (ref 70–99)
HDLC SERPL-MCNC: 52 MG/DL (ref 40–59)
LDLC SERPL CALC-MCNC: 93 MG/DL (ref ?–100)
NONHDLC SERPL-MCNC: 114 MG/DL (ref ?–130)
OSMOLALITY SERPL CALC.SUM OF ELEC: 292 MOSM/KG (ref 275–295)
POTASSIUM SERPL-SCNC: 4.6 MMOL/L (ref 3.5–5.1)
PROT SERPL-MCNC: 7 G/DL (ref 6.4–8.2)
SODIUM SERPL-SCNC: 140 MMOL/L (ref 136–145)
TRIGL SERPL-MCNC: 116 MG/DL (ref 30–149)
VLDLC SERPL CALC-MCNC: 19 MG/DL (ref 0–30)

## 2022-05-17 PROCEDURE — 80053 COMPREHEN METABOLIC PANEL: CPT | Performed by: FAMILY MEDICINE

## 2022-05-17 PROCEDURE — 80061 LIPID PANEL: CPT | Performed by: FAMILY MEDICINE

## 2022-07-19 ENCOUNTER — LAB ENCOUNTER (OUTPATIENT)
Dept: LAB | Age: 57
End: 2022-07-19
Attending: FAMILY MEDICINE
Payer: COMMERCIAL

## 2022-07-19 ENCOUNTER — OFFICE VISIT (OUTPATIENT)
Dept: FAMILY MEDICINE CLINIC | Facility: CLINIC | Age: 57
End: 2022-07-19
Payer: COMMERCIAL

## 2022-07-19 VITALS
HEART RATE: 76 BPM | SYSTOLIC BLOOD PRESSURE: 132 MMHG | TEMPERATURE: 97 F | RESPIRATION RATE: 18 BRPM | OXYGEN SATURATION: 97 % | WEIGHT: 244 LBS | BODY MASS INDEX: 36.98 KG/M2 | HEIGHT: 68 IN | DIASTOLIC BLOOD PRESSURE: 76 MMHG

## 2022-07-19 DIAGNOSIS — E78.00 HYPERCHOLESTEREMIA: ICD-10-CM

## 2022-07-19 DIAGNOSIS — Z12.5 SCREENING FOR PROSTATE CANCER: ICD-10-CM

## 2022-07-19 DIAGNOSIS — E66.3 OVERWEIGHT ON EXAMINATION: ICD-10-CM

## 2022-07-19 DIAGNOSIS — I10 ESSENTIAL HYPERTENSION: Primary | ICD-10-CM

## 2022-07-19 LAB
PSA SERPL-MCNC: 1.55 NG/ML (ref ?–4)
T4 FREE SERPL-MCNC: 0.9 NG/DL (ref 0.8–1.7)
TSI SER-ACNC: 2.63 MIU/ML (ref 0.36–3.74)

## 2022-07-19 PROCEDURE — 84153 ASSAY OF PSA TOTAL: CPT | Performed by: FAMILY MEDICINE

## 2022-07-19 PROCEDURE — 3078F DIAST BP <80 MM HG: CPT | Performed by: FAMILY MEDICINE

## 2022-07-19 PROCEDURE — 84443 ASSAY THYROID STIM HORMONE: CPT | Performed by: FAMILY MEDICINE

## 2022-07-19 PROCEDURE — 99214 OFFICE O/P EST MOD 30 MIN: CPT | Performed by: FAMILY MEDICINE

## 2022-07-19 PROCEDURE — 3008F BODY MASS INDEX DOCD: CPT | Performed by: FAMILY MEDICINE

## 2022-07-19 PROCEDURE — 84439 ASSAY OF FREE THYROXINE: CPT | Performed by: FAMILY MEDICINE

## 2022-07-19 PROCEDURE — 3075F SYST BP GE 130 - 139MM HG: CPT | Performed by: FAMILY MEDICINE

## 2022-07-19 RX ORDER — LOSARTAN POTASSIUM AND HYDROCHLOROTHIAZIDE 12.5; 1 MG/1; MG/1
1 TABLET ORAL DAILY
Qty: 90 TABLET | Refills: 1 | Status: SHIPPED | OUTPATIENT
Start: 2022-07-19

## 2022-07-19 RX ORDER — SIMVASTATIN 10 MG
10 TABLET ORAL NIGHTLY
Qty: 90 TABLET | Refills: 1 | Status: SHIPPED | OUTPATIENT
Start: 2022-07-19

## 2022-07-19 RX ORDER — HYDROCHLOROTHIAZIDE 12.5 MG/1
12.5 CAPSULE, GELATIN COATED ORAL DAILY
Qty: 90 CAPSULE | Refills: 1 | Status: SHIPPED | OUTPATIENT
Start: 2022-07-19

## 2022-10-17 ENCOUNTER — OFFICE VISIT (OUTPATIENT)
Dept: FAMILY MEDICINE CLINIC | Facility: CLINIC | Age: 57
End: 2022-10-17
Payer: COMMERCIAL

## 2022-10-17 VITALS
HEART RATE: 71 BPM | BODY MASS INDEX: 37.59 KG/M2 | DIASTOLIC BLOOD PRESSURE: 68 MMHG | SYSTOLIC BLOOD PRESSURE: 130 MMHG | HEIGHT: 68 IN | TEMPERATURE: 98 F | WEIGHT: 248 LBS | RESPIRATION RATE: 18 BRPM | OXYGEN SATURATION: 98 %

## 2022-10-17 DIAGNOSIS — S22.31XB OPEN FRACTURE OF ONE RIB OF RIGHT SIDE, INITIAL ENCOUNTER: Primary | ICD-10-CM

## 2022-10-17 DIAGNOSIS — S49.91XD INJURY OF RIGHT SHOULDER, SUBSEQUENT ENCOUNTER: ICD-10-CM

## 2022-10-17 LAB
APPEARANCE: CLEAR
BILIRUBIN: NEGATIVE
GLUCOSE (URINE DIPSTICK): NEGATIVE MG/DL
KETONES (URINE DIPSTICK): NEGATIVE MG/DL
LEUKOCYTES: NEGATIVE
MULTISTIX LOT#: NORMAL NUMERIC
NITRITE, URINE: NEGATIVE
OCCULT BLOOD: NEGATIVE
PH, URINE: 7.5 (ref 4.5–8)
PROTEIN (URINE DIPSTICK): NEGATIVE MG/DL
SPECIFIC GRAVITY: 1.02 (ref 1–1.03)
URINE-COLOR: YELLOW
UROBILINOGEN,SEMI-QN: 0.2 MG/DL (ref 0–1.9)

## 2022-10-17 RX ORDER — HYDROCODONE BITARTRATE AND ACETAMINOPHEN 5; 325 MG/1; MG/1
1-2 TABLET ORAL AS NEEDED
COMMUNITY
Start: 2022-10-15 | End: 2022-10-18

## 2022-10-26 ENCOUNTER — HOSPITAL ENCOUNTER (OUTPATIENT)
Dept: MRI IMAGING | Facility: HOSPITAL | Age: 57
Discharge: HOME | End: 2022-10-26
Attending: FAMILY MEDICINE
Payer: COMMERCIAL

## 2022-10-26 DIAGNOSIS — S49.91XD INJURY OF RIGHT SHOULDER, SUBSEQUENT ENCOUNTER: ICD-10-CM

## 2022-10-26 PROCEDURE — 73221 MRI JOINT UPR EXTREM W/O DYE: CPT | Performed by: FAMILY MEDICINE

## 2022-10-27 ENCOUNTER — OFFICE VISIT (OUTPATIENT)
Dept: FAMILY MEDICINE CLINIC | Facility: CLINIC | Age: 57
End: 2022-10-27
Payer: COMMERCIAL

## 2022-10-27 VITALS
SYSTOLIC BLOOD PRESSURE: 128 MMHG | RESPIRATION RATE: 18 BRPM | DIASTOLIC BLOOD PRESSURE: 76 MMHG | OXYGEN SATURATION: 98 % | HEIGHT: 68 IN | WEIGHT: 248 LBS | HEART RATE: 69 BPM | TEMPERATURE: 97 F | BODY MASS INDEX: 37.59 KG/M2

## 2022-10-27 DIAGNOSIS — I10 ESSENTIAL HYPERTENSION: ICD-10-CM

## 2022-10-27 DIAGNOSIS — E66.01 CLASS 2 SEVERE OBESITY DUE TO EXCESS CALORIES WITH SERIOUS COMORBIDITY AND BODY MASS INDEX (BMI) OF 37.0 TO 37.9 IN ADULT (HCC): ICD-10-CM

## 2022-10-27 DIAGNOSIS — E78.00 HYPERCHOLESTEREMIA: ICD-10-CM

## 2022-10-27 DIAGNOSIS — Z23 NEED FOR VACCINATION: ICD-10-CM

## 2022-10-27 DIAGNOSIS — Z00.00 ANNUAL PHYSICAL EXAM: Primary | ICD-10-CM

## 2022-10-27 PROCEDURE — 90715 TDAP VACCINE 7 YRS/> IM: CPT | Performed by: FAMILY MEDICINE

## 2022-10-27 PROCEDURE — 3074F SYST BP LT 130 MM HG: CPT | Performed by: FAMILY MEDICINE

## 2022-10-27 PROCEDURE — 3008F BODY MASS INDEX DOCD: CPT | Performed by: FAMILY MEDICINE

## 2022-10-27 PROCEDURE — 99396 PREV VISIT EST AGE 40-64: CPT | Performed by: FAMILY MEDICINE

## 2022-10-27 PROCEDURE — 3078F DIAST BP <80 MM HG: CPT | Performed by: FAMILY MEDICINE

## 2022-10-27 PROCEDURE — 90471 IMMUNIZATION ADMIN: CPT | Performed by: FAMILY MEDICINE

## 2022-11-02 ENCOUNTER — MED REC SCAN ONLY (OUTPATIENT)
Dept: FAMILY MEDICINE CLINIC | Facility: CLINIC | Age: 57
End: 2022-11-02

## 2022-11-08 ENCOUNTER — TELEPHONE (OUTPATIENT)
Dept: FAMILY MEDICINE CLINIC | Facility: CLINIC | Age: 57
End: 2022-11-08

## 2022-11-08 DIAGNOSIS — Z01.818 PRE-OP TESTING: Primary | ICD-10-CM

## 2022-11-08 NOTE — TELEPHONE ENCOUNTER
Pt is having surgery on 12/19/2022 for a right arthroscopic rotator cuff repair with Dr. Herminia Lee. Pre op orders for a CBC,CMP and EKG placed. Paperwork to the [facility] folder for them to call and schedule pt.

## 2022-11-22 ENCOUNTER — EKG ENCOUNTER (OUTPATIENT)
Dept: LAB | Age: 57
End: 2022-11-22
Attending: NURSE PRACTITIONER
Payer: COMMERCIAL

## 2022-11-22 ENCOUNTER — OFFICE VISIT (OUTPATIENT)
Dept: FAMILY MEDICINE CLINIC | Facility: CLINIC | Age: 57
End: 2022-11-22
Payer: COMMERCIAL

## 2022-11-22 ENCOUNTER — LAB ENCOUNTER (OUTPATIENT)
Dept: LAB | Age: 57
End: 2022-11-22
Attending: NURSE PRACTITIONER
Payer: COMMERCIAL

## 2022-11-22 ENCOUNTER — PATIENT MESSAGE (OUTPATIENT)
Dept: FAMILY MEDICINE CLINIC | Facility: CLINIC | Age: 57
End: 2022-11-22

## 2022-11-22 VITALS
RESPIRATION RATE: 20 BRPM | SYSTOLIC BLOOD PRESSURE: 136 MMHG | HEIGHT: 68.19 IN | WEIGHT: 246.81 LBS | BODY MASS INDEX: 37.41 KG/M2 | TEMPERATURE: 97 F | HEART RATE: 72 BPM | DIASTOLIC BLOOD PRESSURE: 80 MMHG

## 2022-11-22 DIAGNOSIS — M75.101 TEAR OF RIGHT ROTATOR CUFF, UNSPECIFIED TEAR EXTENT, UNSPECIFIED WHETHER TRAUMATIC: ICD-10-CM

## 2022-11-22 DIAGNOSIS — E78.00 HYPERCHOLESTEREMIA: ICD-10-CM

## 2022-11-22 DIAGNOSIS — Z01.818 PRE-OPERATIVE CLEARANCE: ICD-10-CM

## 2022-11-22 DIAGNOSIS — R94.39 ABNORMAL FINDING ON CARDIOVASCULAR STRESS TEST: ICD-10-CM

## 2022-11-22 DIAGNOSIS — Z23 NEED FOR VACCINATION: ICD-10-CM

## 2022-11-22 DIAGNOSIS — I10 ESSENTIAL HYPERTENSION: ICD-10-CM

## 2022-11-22 DIAGNOSIS — Z01.818 PRE-OPERATIVE CLEARANCE: Primary | ICD-10-CM

## 2022-11-22 DIAGNOSIS — Z01.818 PRE-OP TESTING: ICD-10-CM

## 2022-11-22 DIAGNOSIS — E66.01 CLASS 2 SEVERE OBESITY DUE TO EXCESS CALORIES WITH SERIOUS COMORBIDITY AND BODY MASS INDEX (BMI) OF 37.0 TO 37.9 IN ADULT (HCC): ICD-10-CM

## 2022-11-22 LAB
ALBUMIN SERPL-MCNC: 4.2 G/DL (ref 3.4–5)
ALBUMIN/GLOB SERPL: 1.6 {RATIO} (ref 1–2)
ALP LIVER SERPL-CCNC: 89 U/L
ALT SERPL-CCNC: 26 U/L
ANION GAP SERPL CALC-SCNC: 5 MMOL/L (ref 0–18)
AST SERPL-CCNC: 14 U/L (ref 15–37)
ATRIAL RATE: 62 BPM
BASOPHILS # BLD AUTO: 0.09 X10(3) UL (ref 0–0.2)
BASOPHILS NFR BLD AUTO: 1.9 %
BILIRUB SERPL-MCNC: 0.7 MG/DL (ref 0.1–2)
BUN BLD-MCNC: 14 MG/DL (ref 7–18)
BUN/CREAT SERPL: 16.9 (ref 10–20)
CALCIUM BLD-MCNC: 9.8 MG/DL (ref 8.5–10.1)
CHLORIDE SERPL-SCNC: 104 MMOL/L (ref 98–112)
CO2 SERPL-SCNC: 30 MMOL/L (ref 21–32)
CREAT BLD-MCNC: 0.83 MG/DL
DEPRECATED RDW RBC AUTO: 44.4 FL (ref 35.1–46.3)
EOSINOPHIL # BLD AUTO: 0.17 X10(3) UL (ref 0–0.7)
EOSINOPHIL NFR BLD AUTO: 3.5 %
ERYTHROCYTE [DISTWIDTH] IN BLOOD BY AUTOMATED COUNT: 13 % (ref 11–15)
FASTING STATUS PATIENT QL REPORTED: YES
GFR SERPLBLD BASED ON 1.73 SQ M-ARVRAT: 101 ML/MIN/1.73M2 (ref 60–?)
GLOBULIN PLAS-MCNC: 2.6 G/DL (ref 2.8–4.4)
GLUCOSE BLD-MCNC: 93 MG/DL (ref 70–99)
HCT VFR BLD AUTO: 43.8 %
HGB BLD-MCNC: 14.1 G/DL
IMM GRANULOCYTES # BLD AUTO: 0.01 X10(3) UL (ref 0–1)
IMM GRANULOCYTES NFR BLD: 0.2 %
LYMPHOCYTES # BLD AUTO: 1.73 X10(3) UL (ref 1–4)
LYMPHOCYTES NFR BLD AUTO: 35.7 %
MCH RBC QN AUTO: 30.1 PG (ref 26–34)
MCHC RBC AUTO-ENTMCNC: 32.2 G/DL (ref 31–37)
MCV RBC AUTO: 93.6 FL
MONOCYTES # BLD AUTO: 0.59 X10(3) UL (ref 0.1–1)
MONOCYTES NFR BLD AUTO: 12.2 %
NEUTROPHILS # BLD AUTO: 2.25 X10 (3) UL (ref 1.5–7.7)
NEUTROPHILS # BLD AUTO: 2.25 X10(3) UL (ref 1.5–7.7)
NEUTROPHILS NFR BLD AUTO: 46.5 %
OSMOLALITY SERPL CALC.SUM OF ELEC: 288 MOSM/KG (ref 275–295)
P AXIS: 61 DEGREES
P-R INTERVAL: 166 MS
PLATELET # BLD AUTO: 274 10(3)UL (ref 150–450)
POTASSIUM SERPL-SCNC: 4.5 MMOL/L (ref 3.5–5.1)
PROT SERPL-MCNC: 6.8 G/DL (ref 6.4–8.2)
Q-T INTERVAL: 414 MS
QRS DURATION: 94 MS
QTC CALCULATION (BEZET): 420 MS
R AXIS: -12 DEGREES
RBC # BLD AUTO: 4.68 X10(6)UL
SODIUM SERPL-SCNC: 139 MMOL/L (ref 136–145)
T AXIS: 36 DEGREES
VENTRICULAR RATE: 62 BPM
WBC # BLD AUTO: 4.8 X10(3) UL (ref 4–11)

## 2022-11-22 PROCEDURE — 93010 ELECTROCARDIOGRAM REPORT: CPT | Performed by: INTERNAL MEDICINE

## 2022-11-22 PROCEDURE — 80053 COMPREHEN METABOLIC PANEL: CPT | Performed by: FAMILY MEDICINE

## 2022-11-22 PROCEDURE — 3008F BODY MASS INDEX DOCD: CPT | Performed by: NURSE PRACTITIONER

## 2022-11-22 PROCEDURE — 3075F SYST BP GE 130 - 139MM HG: CPT | Performed by: NURSE PRACTITIONER

## 2022-11-22 PROCEDURE — 99244 OFF/OP CNSLTJ NEW/EST MOD 40: CPT | Performed by: NURSE PRACTITIONER

## 2022-11-22 PROCEDURE — 90471 IMMUNIZATION ADMIN: CPT | Performed by: NURSE PRACTITIONER

## 2022-11-22 PROCEDURE — 90686 IIV4 VACC NO PRSV 0.5 ML IM: CPT | Performed by: NURSE PRACTITIONER

## 2022-11-22 PROCEDURE — 93005 ELECTROCARDIOGRAM TRACING: CPT

## 2022-11-22 PROCEDURE — 85025 COMPLETE CBC W/AUTO DIFF WBC: CPT | Performed by: FAMILY MEDICINE

## 2022-11-22 PROCEDURE — 3079F DIAST BP 80-89 MM HG: CPT | Performed by: NURSE PRACTITIONER

## 2022-11-23 NOTE — TELEPHONE ENCOUNTER
From: Luis Graves  To: SUGEY Grossman  Sent: 11/22/2022 9:07 PM CST  Subject: EKG    Carine, thanks for getting back to me regarding the results of the bloodwork. Can you interpret the EKG findings for me? Also, I am having the echo on Friday. I appreciate you and thank you for your care. Happy Thanksgiving to you and your family.

## 2022-11-25 ENCOUNTER — HOSPITAL ENCOUNTER (OUTPATIENT)
Dept: CV DIAGNOSTICS | Facility: HOSPITAL | Age: 57
Discharge: HOME | End: 2022-11-25
Attending: NURSE PRACTITIONER
Payer: COMMERCIAL

## 2022-11-25 DIAGNOSIS — R94.39 ABNORMAL FINDING ON CARDIOVASCULAR STRESS TEST: ICD-10-CM

## 2022-11-25 PROCEDURE — 93306 TTE W/DOPPLER COMPLETE: CPT | Performed by: NURSE PRACTITIONER

## 2023-01-23 DIAGNOSIS — I10 ESSENTIAL HYPERTENSION: ICD-10-CM

## 2023-01-23 RX ORDER — LOSARTAN POTASSIUM AND HYDROCHLOROTHIAZIDE 12.5; 1 MG/1; MG/1
TABLET ORAL
Qty: 90 TABLET | Refills: 0 | Status: SHIPPED | OUTPATIENT
Start: 2023-01-23

## 2023-02-03 DIAGNOSIS — I10 ESSENTIAL HYPERTENSION: ICD-10-CM

## 2023-02-03 RX ORDER — HYDROCHLOROTHIAZIDE 12.5 MG/1
CAPSULE, GELATIN COATED ORAL
Qty: 90 CAPSULE | Refills: 1 | Status: SHIPPED | OUTPATIENT
Start: 2023-02-03

## 2023-02-06 ENCOUNTER — MED REC SCAN ONLY (OUTPATIENT)
Dept: FAMILY MEDICINE CLINIC | Facility: CLINIC | Age: 58
End: 2023-02-06

## 2023-02-16 ENCOUNTER — TELEPHONE (OUTPATIENT)
Dept: FAMILY MEDICINE CLINIC | Facility: CLINIC | Age: 58
End: 2023-02-16

## 2023-04-24 DIAGNOSIS — I10 ESSENTIAL HYPERTENSION: ICD-10-CM

## 2023-04-24 RX ORDER — LOSARTAN POTASSIUM AND HYDROCHLOROTHIAZIDE 12.5; 1 MG/1; MG/1
TABLET ORAL
Qty: 90 TABLET | Refills: 0 | Status: SHIPPED | OUTPATIENT
Start: 2023-04-24

## 2023-04-28 DIAGNOSIS — E78.00 HYPERCHOLESTEREMIA: ICD-10-CM

## 2023-04-28 RX ORDER — SIMVASTATIN 10 MG
TABLET ORAL
Qty: 90 TABLET | Refills: 0 | Status: SHIPPED | OUTPATIENT
Start: 2023-04-28

## 2023-07-07 ENCOUNTER — MED REC SCAN ONLY (OUTPATIENT)
Dept: FAMILY MEDICINE CLINIC | Facility: CLINIC | Age: 58
End: 2023-07-07

## 2023-07-24 DIAGNOSIS — I10 ESSENTIAL HYPERTENSION: ICD-10-CM

## 2023-07-24 RX ORDER — LOSARTAN POTASSIUM AND HYDROCHLOROTHIAZIDE 12.5; 1 MG/1; MG/1
1 TABLET ORAL DAILY
Qty: 90 TABLET | Refills: 0 | Status: SHIPPED | OUTPATIENT
Start: 2023-07-24

## 2023-07-27 DIAGNOSIS — E78.00 HYPERCHOLESTEREMIA: ICD-10-CM

## 2023-07-27 RX ORDER — SIMVASTATIN 10 MG
10 TABLET ORAL NIGHTLY
Qty: 90 TABLET | Refills: 0 | Status: SHIPPED | OUTPATIENT
Start: 2023-07-27

## 2023-08-02 DIAGNOSIS — I10 ESSENTIAL HYPERTENSION: ICD-10-CM

## 2023-08-02 RX ORDER — HYDROCHLOROTHIAZIDE 12.5 MG/1
CAPSULE, GELATIN COATED ORAL
Qty: 90 CAPSULE | Refills: 0 | Status: SHIPPED | OUTPATIENT
Start: 2023-08-02

## 2023-12-01 DIAGNOSIS — I10 ESSENTIAL HYPERTENSION: ICD-10-CM

## 2023-12-01 DIAGNOSIS — E78.00 HYPERCHOLESTEREMIA: ICD-10-CM

## 2023-12-06 ENCOUNTER — TELEPHONE (OUTPATIENT)
Dept: FAMILY MEDICINE CLINIC | Facility: CLINIC | Age: 58
End: 2023-12-06

## 2023-12-06 DIAGNOSIS — Z00.00 ROUTINE GENERAL MEDICAL EXAMINATION AT A HEALTH CARE FACILITY: ICD-10-CM

## 2023-12-06 DIAGNOSIS — E78.00 HYPERCHOLESTEREMIA: Primary | ICD-10-CM

## 2023-12-06 DIAGNOSIS — Z12.5 SCREENING FOR PROSTATE CANCER: ICD-10-CM

## 2023-12-06 RX ORDER — SIMVASTATIN 10 MG
10 TABLET ORAL NIGHTLY
Qty: 90 TABLET | Refills: 0 | OUTPATIENT
Start: 2023-12-06

## 2023-12-06 RX ORDER — LOSARTAN POTASSIUM AND HYDROCHLOROTHIAZIDE 12.5; 1 MG/1; MG/1
1 TABLET ORAL DAILY
Qty: 90 TABLET | Refills: 0 | OUTPATIENT
Start: 2023-12-06

## 2023-12-06 RX ORDER — HYDROCHLOROTHIAZIDE 12.5 MG/1
12.5 CAPSULE, GELATIN COATED ORAL DAILY
Qty: 90 CAPSULE | Refills: 0 | OUTPATIENT
Start: 2023-12-06

## 2023-12-06 NOTE — TELEPHONE ENCOUNTER
[facility] requesting refill for:      hydroCHLOROthiazide 12.5 MG Oral Cap 90     simvastatin 10 MG Oral Tab 90 tablet     Losartan Potassium-HCTZ 100-12.5 MG Oral Tab 90       RX to: 90tabs x 3 months    [address], [place] - [address] [phone], [phone]   NOV: 12/12/2023

## 2023-12-06 NOTE — TELEPHONE ENCOUNTER
Patient coming in for physical 12/12/23. Pt needs blood tests done. Please see pended orders and sign if appropriate. Thanks.

## 2023-12-06 NOTE — TELEPHONE ENCOUNTER
See TE from today, [facility]    Pt seeing you 12.12  Last ov over yr ago    Please approve if appropriate. Thanks.

## 2023-12-15 ENCOUNTER — PATIENT MESSAGE (OUTPATIENT)
Dept: FAMILY MEDICINE CLINIC | Facility: CLINIC | Age: 58
End: 2023-12-15

## 2023-12-15 DIAGNOSIS — E78.00 HYPERCHOLESTEREMIA: ICD-10-CM

## 2023-12-15 DIAGNOSIS — I10 ESSENTIAL HYPERTENSION: ICD-10-CM

## 2023-12-19 RX ORDER — HYDROCHLOROTHIAZIDE 12.5 MG/1
12.5 CAPSULE, GELATIN COATED ORAL DAILY
Qty: 90 CAPSULE | Refills: 0
Start: 2023-12-19

## 2023-12-19 RX ORDER — SIMVASTATIN 10 MG
10 TABLET ORAL NIGHTLY
Qty: 90 TABLET | Refills: 0
Start: 2023-12-19

## 2023-12-19 RX ORDER — LOSARTAN POTASSIUM AND HYDROCHLOROTHIAZIDE 12.5; 1 MG/1; MG/1
1 TABLET ORAL DAILY
Qty: 90 TABLET | Refills: 0
Start: 2023-12-19

## 2023-12-19 NOTE — TELEPHONE ENCOUNTER
LOV: pt has cancelled the last 2 out of 3 appointments and was a no show on the last appointment. His last actual visit was over 1 year ago on 11/22/2022 for a pre op. NOV:01/09/2024    These were pended to you to see if you want to authorize any refills for pt.

## 2024-01-09 ENCOUNTER — OFFICE VISIT (OUTPATIENT)
Dept: FAMILY MEDICINE CLINIC | Facility: CLINIC | Age: 59
End: 2024-01-09
Payer: COMMERCIAL

## 2024-01-09 ENCOUNTER — LAB ENCOUNTER (OUTPATIENT)
Dept: LAB | Age: 59
End: 2024-01-09
Attending: FAMILY MEDICINE
Payer: COMMERCIAL

## 2024-01-09 VITALS
DIASTOLIC BLOOD PRESSURE: 70 MMHG | BODY MASS INDEX: 38.8 KG/M2 | SYSTOLIC BLOOD PRESSURE: 130 MMHG | TEMPERATURE: 98 F | WEIGHT: 256 LBS | RESPIRATION RATE: 18 BRPM | HEART RATE: 67 BPM | HEIGHT: 68 IN

## 2024-01-09 DIAGNOSIS — E78.00 HYPERCHOLESTEREMIA: ICD-10-CM

## 2024-01-09 DIAGNOSIS — I10 ESSENTIAL HYPERTENSION: ICD-10-CM

## 2024-01-09 DIAGNOSIS — Z00.00 ROUTINE GENERAL MEDICAL EXAMINATION AT A HEALTH CARE FACILITY: ICD-10-CM

## 2024-01-09 DIAGNOSIS — Z00.00 HEALTHCARE MAINTENANCE: ICD-10-CM

## 2024-01-09 DIAGNOSIS — Z23 ENCOUNTER FOR IMMUNIZATION: ICD-10-CM

## 2024-01-09 DIAGNOSIS — Z12.5 SCREENING FOR PROSTATE CANCER: ICD-10-CM

## 2024-01-09 LAB
ALBUMIN SERPL-MCNC: 3.9 G/DL (ref 3.4–5)
ALBUMIN/GLOB SERPL: 1.3 {RATIO} (ref 1–2)
ALP LIVER SERPL-CCNC: 66 U/L
ALT SERPL-CCNC: 26 U/L
ANION GAP SERPL CALC-SCNC: 4 MMOL/L (ref 0–18)
AST SERPL-CCNC: 15 U/L (ref 15–37)
BASOPHILS # BLD AUTO: 0.08 X10(3) UL (ref 0–0.2)
BASOPHILS NFR BLD AUTO: 1.7 %
BILIRUB SERPL-MCNC: 0.8 MG/DL (ref 0.1–2)
BILIRUB UR QL STRIP.AUTO: NEGATIVE
BUN BLD-MCNC: 15 MG/DL (ref 9–23)
CALCIUM BLD-MCNC: 9 MG/DL (ref 8.5–10.1)
CHLORIDE SERPL-SCNC: 109 MMOL/L (ref 98–112)
CHOLEST SERPL-MCNC: 193 MG/DL (ref ?–200)
CLARITY UR REFRACT.AUTO: CLEAR
CO2 SERPL-SCNC: 27 MMOL/L (ref 21–32)
COMPLEXED PSA SERPL-MCNC: 1.36 NG/ML (ref ?–4)
CREAT BLD-MCNC: 0.87 MG/DL
EGFRCR SERPLBLD CKD-EPI 2021: 99 ML/MIN/1.73M2 (ref 60–?)
EOSINOPHIL # BLD AUTO: 0.25 X10(3) UL (ref 0–0.7)
EOSINOPHIL NFR BLD AUTO: 5.3 %
ERYTHROCYTE [DISTWIDTH] IN BLOOD BY AUTOMATED COUNT: 13.2 %
FASTING PATIENT LIPID ANSWER: YES
FASTING STATUS PATIENT QL REPORTED: YES
GLOBULIN PLAS-MCNC: 3.1 G/DL (ref 2.8–4.4)
GLUCOSE BLD-MCNC: 111 MG/DL (ref 70–99)
GLUCOSE UR STRIP.AUTO-MCNC: NORMAL MG/DL
HCT VFR BLD AUTO: 40.9 %
HDLC SERPL-MCNC: 52 MG/DL (ref 40–59)
HGB BLD-MCNC: 13.6 G/DL
IMM GRANULOCYTES # BLD AUTO: 0.02 X10(3) UL (ref 0–1)
IMM GRANULOCYTES NFR BLD: 0.4 %
KETONES UR STRIP.AUTO-MCNC: NEGATIVE MG/DL
LDLC SERPL CALC-MCNC: 114 MG/DL (ref ?–100)
LEUKOCYTE ESTERASE UR QL STRIP.AUTO: NEGATIVE
LYMPHOCYTES # BLD AUTO: 1.86 X10(3) UL (ref 1–4)
LYMPHOCYTES NFR BLD AUTO: 39.5 %
MCH RBC QN AUTO: 29.8 PG (ref 26–34)
MCHC RBC AUTO-ENTMCNC: 33.3 G/DL (ref 31–37)
MCV RBC AUTO: 89.5 FL
MONOCYTES # BLD AUTO: 0.41 X10(3) UL (ref 0.1–1)
MONOCYTES NFR BLD AUTO: 8.7 %
NEUTROPHILS # BLD AUTO: 2.09 X10 (3) UL (ref 1.5–7.7)
NEUTROPHILS # BLD AUTO: 2.09 X10(3) UL (ref 1.5–7.7)
NEUTROPHILS NFR BLD AUTO: 44.4 %
NITRITE UR QL STRIP.AUTO: NEGATIVE
NONHDLC SERPL-MCNC: 141 MG/DL (ref ?–130)
OSMOLALITY SERPL CALC.SUM OF ELEC: 292 MOSM/KG (ref 275–295)
PH UR STRIP.AUTO: 7 [PH] (ref 5–8)
PLATELET # BLD AUTO: 222 10(3)UL (ref 150–450)
POTASSIUM SERPL-SCNC: 3.9 MMOL/L (ref 3.5–5.1)
PROT SERPL-MCNC: 7 G/DL (ref 6.4–8.2)
PROT UR STRIP.AUTO-MCNC: NEGATIVE MG/DL
RBC # BLD AUTO: 4.57 X10(6)UL
RBC UR QL AUTO: NEGATIVE
SODIUM SERPL-SCNC: 140 MMOL/L (ref 136–145)
SP GR UR STRIP.AUTO: 1.02 (ref 1–1.03)
TRIGL SERPL-MCNC: 153 MG/DL (ref 30–149)
TSI SER-ACNC: 2.63 MIU/ML (ref 0.36–3.74)
UROBILINOGEN UR STRIP.AUTO-MCNC: NORMAL MG/DL
VLDLC SERPL CALC-MCNC: 27 MG/DL (ref 0–30)
WBC # BLD AUTO: 4.7 X10(3) UL (ref 4–11)

## 2024-01-09 PROCEDURE — 90471 IMMUNIZATION ADMIN: CPT | Performed by: FAMILY MEDICINE

## 2024-01-09 PROCEDURE — 80053 COMPREHEN METABOLIC PANEL: CPT

## 2024-01-09 PROCEDURE — 3078F DIAST BP <80 MM HG: CPT | Performed by: FAMILY MEDICINE

## 2024-01-09 PROCEDURE — 80061 LIPID PANEL: CPT

## 2024-01-09 PROCEDURE — 99386 PREV VISIT NEW AGE 40-64: CPT | Performed by: FAMILY MEDICINE

## 2024-01-09 PROCEDURE — 81003 URINALYSIS AUTO W/O SCOPE: CPT

## 2024-01-09 PROCEDURE — 3075F SYST BP GE 130 - 139MM HG: CPT | Performed by: FAMILY MEDICINE

## 2024-01-09 PROCEDURE — 90750 HZV VACC RECOMBINANT IM: CPT | Performed by: FAMILY MEDICINE

## 2024-01-09 PROCEDURE — 84443 ASSAY THYROID STIM HORMONE: CPT

## 2024-01-09 PROCEDURE — 3008F BODY MASS INDEX DOCD: CPT | Performed by: FAMILY MEDICINE

## 2024-01-09 PROCEDURE — 85025 COMPLETE CBC W/AUTO DIFF WBC: CPT

## 2024-01-09 RX ORDER — TADALAFIL 5 MG/1
5 TABLET ORAL
COMMUNITY
Start: 2023-06-05

## 2024-01-09 NOTE — PROGRESS NOTES
[facility] Family Medicine Office Note  Chief Complaint:   Chief Complaint   Patient presents with    Physical       HPI:   This is a 58 year old male coming in for physical exam.  The patient states that he has been doing well states that he has been taking his medications as prescribed he has not had any headaches chest pain shortness of breath does have a past medical history of hypertension as well as hypercholesterolemia      Past Medical History:   Diagnosis Date    Depressive disorder, not elsewhere classified     Diverticulitis     Herpes zoster without mention of complication     HIGH BLOOD PRESSURE     HIGH CHOLESTEROL     Iron deficiency anemia, unspecified     Obstructive sleep apnea (adult) (pediatric) [place] HST 3-14-17    AHI 14.7 supine AHI 21 snoring SaO2 dwight 83 % autoPAP 6-20 Premier    Other symptoms involving cardiovascular system     Problems with swallowing 2015    Screening for other and unspecified genitourinary condition     Sleep apnea     Special screening for malignant neoplasm of prostate     Unspecified sinusitis (chronic)     Visual impairment     glasses     Past Surgical History:   Procedure Laterality Date    APPENDECTOMY      APPENDECTOMY      COLONOSCOPY  08/08    10 year repeat    HERNIA SURGERY  2012    umbilical hernia    KNEE REPLACEMENT SURGERY  02/29/08    left knee    TOTAL HIP REPLACEMENT  11/30/07    right hip    TOTAL KNEE REPLACEMENT      LEFT KNEE     Social History:  Social History     Socioeconomic History    Marital status:    Tobacco Use    Smoking status: Never    Smokeless tobacco: Never   Vaping Use    Vaping Use: Never used   Substance and Sexual Activity    Alcohol use: Yes     Alcohol/week: 4.0 standard drinks of alcohol     Types: 2 Glasses of wine, 2 Cans of beer per week     Comment: 2 drinks/wk    Drug use: No   Other Topics Concern    Caffeine Concern Yes     Comment: rare     Exercise Yes     Comment: 3 x a week with a        Family History:  Family History   Problem Relation Age of Onset    Stroke Father     Other (alzheimer's disease) Maternal Grandmother     Stroke Paternal Grandfather     Heart Disease Paternal Grandmother      Allergies:  No Known Allergies  Current Meds:  Current Outpatient Medications   Medication Sig Dispense Refill    Glucosa-Chondr-Na Chondr--193-700-83 MG Oral Tab Take 2 tablets by mouth daily.      tadalafil 5 MG Oral Tab Take 1 tablet (5 mg total) by mouth daily as needed.      hydroCHLOROthiazide 12.5 MG Oral Cap TAKE 1 CAPSULE DAILY 90 capsule 0    simvastatin 10 MG Oral Tab Take 1 tablet (10 mg total) by mouth nightly. 90 tablet 0    Losartan Potassium-HCTZ 100-12.5 MG Oral Tab Take 1 tablet by mouth daily. 90 tablet 0    Omeprazole 40 MG Oral Capsule Delayed Release Take 1 capsule (40 mg total) by mouth daily.      Ergocalciferol (VITAMIN D OR) Take 4,000 Int'l Units by mouth daily.      Coenzyme Q10 (COQ-10 OR) Take 1 capsule by mouth daily.      Lactobacillus (PROBIATA OR) Take 1 tablet by mouth daily.      aspirin 81 MG Oral Tab Take 1 tablet (81 mg total) by mouth daily.      Multiple Vitamins-Minerals (MULTIVITAMIN OR) Take 1 tablet by mouth daily.        Counseling given: Not Answered       REVIEW OF SYSTEMS:   Consitutional: No fevers, chills, sweats  Eye: No recent visual problems  ENMT: No ear pain nasal congestion sore throat  Respiratory: No shortness of breath, cough  Cardiovascular: No chest pain palpitations syncope  Gastrointestinal: No nausea vomiting diarrhea  Genitourinary: No hematuria  Hema/Lymph no bruising tendency, swollen lymph glands  Endocrine: Negative for excessive thirst excessive hunger  Musculoskeletal: No back pain neck pain joint pain muscle pain decreased range of motion  Integumentary: No rash, pruritus, abrasions  Neurologic: Alert and oriented x4  Psychiatric: No anxiety, depression    Medical, surgical, family, and social histories were reviewed      EXAM:    VITALS:   Vitals:    01/09/24 1023   BP: 130/70   Pulse: 67   Resp: 18   Temp: 97.6 °F (36.4 °C)      GENERAL: well developed, well nourished, in no apparent distress  SKIN: no rashes, no suspicious lesions: Cool and Dry  HEENT: atraumatic, normocephalic, ears and throat are clear    Ears: TM's clear and visible bilaterally, no excess cerumen or erythema.   EYES: Pupils equal round and reactive.  Extraocular motions intact no scleral icterus no injection or drainage  THROAT without erythema tonsillar hypertrophy or exudate.  Uvula midline airway patent  NECK: Given midline.  No JVD or lymphadenopathy supple nontender no meningeal signs   LUNGS: clear to auscultation sounds equal bilaterally no wheezes rales or rhonchi  CARDIO: Regular rate and rhythm without murmurs gallops or rubs  GI: Soft nontender nondistended no hepatomegaly palpable masses.  No guarding.   without deformity or crepitus no flank tenderness  EXTREMITIES: no cyanosis, clubbing or edema no joint tenderness effusion or edema noted.  No calf tenderness negative Homans' sign bilaterally  NEURO: Awake and alert.  Cranial nerves II through XII intact motor and sensory grossly within normal limits.  5 out of 5 muscle strength in all muscle groups.  Normal speech.  PSYCHIATRIC: Awake, alert and oriented x3, cooperative appropriate mood and affect.  Judgment intact       ASSESSMENT AND PLAN:   1. Healthcare maintenance  I did discuss with the patient on suggest for him to update his blood work you need a fasting lipid panel CBC CMP free T4 free T3 TSH as well as PSA.  Also be updating his Shingrix vaccine today.  He is up-to-date with all other preventative measures he was asked to follow-up yearly for regular physical exams or for any other acute concerns.  2. Essential hypertension  Patient's blood pressure at this time is well-controlled he is currently on losartan he was asked to continue to take the medication as prescribed  3.  Hypercholesteremia  Did asked the patient to continue with simvastatin he was asked to watch his fatty food fried food intake  4. Encounter for immunization  - Zoster Recombinant Adjuvanted (Shingrix -Shingles) [[id number]]       Meds & Refills for this Visit:  Requested Prescriptions      No prescriptions requested or ordered in this encounter       Health Maintenance:  Health Maintenance Due   Topic Date Due    Annual Physical  10/27/2023       Patient/Caregiver Education: Patient/Caregiver Education: There are no barriers to learning. Medical education done.   Outcome: Patient verbalizes understanding. Patient is notified to call with any questions, complications, allergies, or worsening or changing symptoms.  Patient is to call with any side effects or complications from the treatments as a result of today.     Problem List:  Patient Active Problem List   Diagnosis    Unspecified sinusitis (chronic)    Other bursitis disorders    Pain in joint, shoulder region    Essential hypertension    Hypercholesteremia    Dysphagia    Screening for prostate cancer    Screening, anemia, deficiency, iron    Annual physical exam    Diverticulitis of sigmoid colon    Hernia of abdominal wall    Sinusitis    Overweight    Abnormal screening cardiac CT    Traveler's diarrhea    Anxiety    Left low back pain    Daytime somnolence    Snoring    GONZALO (obstructive sleep apnea)    Disorder of left rotator cuff    Special screening for malignant neoplasm of colon    Primary osteoarthritis of right knee         No follow-ups on file.     Narinder Bales MD    Please note that portions of this note may have been completed with a voice recognition program. Efforts were made to edit the dictations but occasionally words are mis-transcribed.

## 2024-01-31 ENCOUNTER — PATIENT MESSAGE (OUTPATIENT)
Dept: FAMILY MEDICINE CLINIC | Facility: CLINIC | Age: 59
End: 2024-01-31

## 2024-01-31 DIAGNOSIS — I10 ESSENTIAL HYPERTENSION: ICD-10-CM

## 2024-01-31 DIAGNOSIS — E78.00 HYPERCHOLESTEREMIA: ICD-10-CM

## 2024-02-01 RX ORDER — SIMVASTATIN 10 MG
10 TABLET ORAL NIGHTLY
Qty: 90 TABLET | Refills: 1 | Status: SHIPPED | OUTPATIENT
Start: 2024-02-01

## 2024-02-01 RX ORDER — LOSARTAN POTASSIUM AND HYDROCHLOROTHIAZIDE 12.5; 1 MG/1; MG/1
1 TABLET ORAL DAILY
Qty: 90 TABLET | Refills: 1 | Status: SHIPPED | OUTPATIENT
Start: 2024-02-01

## 2024-02-01 RX ORDER — HYDROCHLOROTHIAZIDE 12.5 MG/1
12.5 CAPSULE, GELATIN COATED ORAL DAILY
Qty: 90 CAPSULE | Refills: 1 | Status: SHIPPED | OUTPATIENT
Start: 2024-02-01

## 2024-02-01 NOTE — TELEPHONE ENCOUNTER
From: Jean Marie Bone  To: Narinder Bales  Sent: 1/31/2024 10:32 PM CST  Subject: Medication refills     Back in 12-15-23 I requested refills of the two blood pressure medications and the simvastatin. I was told [facility] shows that the medications cannot be refilled as they did not receive a prescription from your office. I was in for my annual physical on 1-9-24.  In my chart it says the prescriptions cannot be refilled through my cart. I have 1 week of my prescriptions left and then they will run out. Please send the scripts to [facility] asap. Thank you

## 2024-07-30 DIAGNOSIS — I10 ESSENTIAL HYPERTENSION: ICD-10-CM

## 2024-07-30 DIAGNOSIS — E78.00 HYPERCHOLESTEREMIA: ICD-10-CM

## 2024-07-30 NOTE — TELEPHONE ENCOUNTER
Please call patient to schedule a med check, then route back to clinical staff. Thank you!     Last Office Visit: 1/9/2024  Last Refill: 2/1/2024  Return to Clinic: 6 months   Protocol:passed  NOV: n/a    Requested Prescriptions     Pending Prescriptions Disp Refills    SIMVASTATIN 10 MG Oral Tab [Pharmacy Med Name: SIMVASTATIN TABS 10MG] 90 tablet 3     Sig: TAKE 1 TABLET NIGHTLY    LOSARTAN POTASSIUM-HCTZ 100-12.5 MG Oral Tab [Pharmacy Med Name: LOSARTAN/HCTZ TABS 100/12.5MG 100/12H] 90 tablet 3     Sig: TAKE 1 TABLET DAILY    HYDROCHLOROTHIAZIDE 12.5 MG Oral Cap [Pharmacy Med Name: HYDROCHLOROTHIAZIDE CAPS 12.5MG] 90 capsule 3     Sig: TAKE 1 CAPSULE DAILY         Please approve if appropriate.     Thank you!

## 2024-07-31 RX ORDER — SIMVASTATIN 10 MG
10 TABLET ORAL NIGHTLY
Qty: 90 TABLET | Refills: 0 | Status: SHIPPED | OUTPATIENT
Start: 2024-07-31

## 2024-07-31 RX ORDER — HYDROCHLOROTHIAZIDE 12.5 MG/1
12.5 CAPSULE, GELATIN COATED ORAL DAILY
Qty: 90 CAPSULE | Refills: 0 | Status: SHIPPED | OUTPATIENT
Start: 2024-07-31

## 2024-07-31 RX ORDER — LOSARTAN POTASSIUM AND HYDROCHLOROTHIAZIDE 12.5; 1 MG/1; MG/1
1 TABLET ORAL DAILY
Qty: 90 TABLET | Refills: 0 | Status: SHIPPED | OUTPATIENT
Start: 2024-07-31

## 2024-08-05 ENCOUNTER — OFFICE VISIT (OUTPATIENT)
Dept: FAMILY MEDICINE CLINIC | Facility: CLINIC | Age: 59
End: 2024-08-05
Payer: COMMERCIAL

## 2024-08-05 VITALS
DIASTOLIC BLOOD PRESSURE: 78 MMHG | TEMPERATURE: 99 F | RESPIRATION RATE: 18 BRPM | WEIGHT: 259 LBS | HEIGHT: 68 IN | HEART RATE: 87 BPM | SYSTOLIC BLOOD PRESSURE: 122 MMHG | BODY MASS INDEX: 39.25 KG/M2

## 2024-08-05 DIAGNOSIS — I10 ESSENTIAL HYPERTENSION: ICD-10-CM

## 2024-08-05 DIAGNOSIS — E78.00 HYPERCHOLESTEREMIA: Primary | ICD-10-CM

## 2024-08-05 PROCEDURE — 3074F SYST BP LT 130 MM HG: CPT | Performed by: FAMILY MEDICINE

## 2024-08-05 PROCEDURE — 99213 OFFICE O/P EST LOW 20 MIN: CPT | Performed by: FAMILY MEDICINE

## 2024-08-05 PROCEDURE — G2211 COMPLEX E/M VISIT ADD ON: HCPCS | Performed by: FAMILY MEDICINE

## 2024-08-05 PROCEDURE — 3008F BODY MASS INDEX DOCD: CPT | Performed by: FAMILY MEDICINE

## 2024-08-05 PROCEDURE — 3078F DIAST BP <80 MM HG: CPT | Performed by: FAMILY MEDICINE

## 2024-08-05 RX ORDER — OXYCODONE HYDROCHLORIDE 5 MG/1
1 TABLET ORAL EVERY 4 HOURS PRN
COMMUNITY

## 2024-08-05 RX ORDER — CHLORHEXIDINE GLUCONATE 213 G/1000ML
SOLUTION TOPICAL
COMMUNITY

## 2024-08-05 RX ORDER — TRAMADOL HYDROCHLORIDE 50 MG/1
50 TABLET ORAL EVERY 6 HOURS PRN
COMMUNITY

## 2024-08-05 RX ORDER — MELOXICAM 15 MG/1
15 TABLET ORAL DAILY
COMMUNITY

## 2024-08-05 RX ORDER — PREGABALIN 50 MG/1
50 CAPSULE ORAL
COMMUNITY

## 2024-08-05 RX ORDER — DOCUSATE SODIUM 50MG AND SENNOSIDES 8.6MG 8.6; 5 MG/1; MG/1
2 TABLET, FILM COATED ORAL DAILY
COMMUNITY
Start: 2024-07-25

## 2024-08-05 RX ORDER — ONDANSETRON 4 MG/1
4 TABLET, ORALLY DISINTEGRATING ORAL
COMMUNITY

## 2024-08-05 RX ORDER — PANTOPRAZOLE SODIUM 40 MG/1
40 TABLET, DELAYED RELEASE ORAL
COMMUNITY

## 2024-08-05 NOTE — PROGRESS NOTES
[facility] Family Medicine Office Note  Chief Complaint:   Chief Complaint   Patient presents with    Medication Follow-Up       HPI:   This is a 58 year old male coming in for medication follow-up.  The patient states that he has been doing well.  States that he has been taking his simvastatin hydrochlorothiazide losartan without issue.  Denies any chest pain nausea vomiting diarrhea constipation.      Past Medical History:    Depressive disorder, not elsewhere classified    Diverticulitis    Herpes zoster without mention of complication    HIGH BLOOD PRESSURE    HIGH CHOLESTEROL    Iron deficiency anemia, unspecified    Obstructive sleep apnea (adult) (pediatric)    AHI 14.7 supine AHI 21 snoring SaO2 dwight 83 % autoPAP 6-20 Premier    Other symptoms involving cardiovascular system    Problems with swallowing    Screening for other and unspecified genitourinary condition    Sleep apnea    Special screening for malignant neoplasm of prostate    Unspecified sinusitis (chronic)    Visual impairment    glasses     Past Surgical History:   Procedure Laterality Date    Appendectomy      Appendectomy      Colonoscopy  08/08    10 year repeat    Hernia surgery  2012    umbilical hernia    Knee replacement surgery  02/29/08    left knee    Total hip replacement  11/30/07    right hip    Total knee replacement      LEFT KNEE     Social History:  Social History     Socioeconomic History    Marital status:    Tobacco Use    Smoking status: Never    Smokeless tobacco: Never   Vaping Use    Vaping status: Never Used   Substance and Sexual Activity    Alcohol use: Yes     Alcohol/week: 4.0 standard drinks of alcohol     Types: 2 Glasses of wine, 2 Cans of beer per week     Comment: 2 drinks/wk    Drug use: No   Other Topics Concern    Caffeine Concern Yes     Comment: rare     Exercise Yes     Comment: 3 x a week with a       Social Determinants of Health      Received from [facility]     Housing Stability     Family History:  Family History   Problem Relation Age of Onset    Stroke Father     Other (alzheimer's disease) Maternal Grandmother     Stroke Paternal Grandfather     Heart Disease Paternal Grandmother      Allergies:  No Known Allergies  Current Meds:  Current Outpatient Medications   Medication Sig Dispense Refill    Meloxicam 15 MG Oral Tab Take 1 tablet (15 mg total) by mouth daily.      pantoprazole 40 MG Oral Tab EC Take 1 tablet (40 mg total) by mouth every morning before breakfast.      SIMVASTATIN 10 MG Oral Tab TAKE 1 TABLET NIGHTLY 90 tablet 0    LOSARTAN POTASSIUM-HCTZ 100-12.5 MG Oral Tab TAKE 1 TABLET DAILY 90 tablet 0    HYDROCHLOROTHIAZIDE 12.5 MG Oral Cap TAKE 1 CAPSULE DAILY 90 capsule 0    Glucosa-Chondr-Na Chondr--826-484-83 MG Oral Tab Take 2 tablets by mouth daily.      tadalafil 5 MG Oral Tab Take 1 tablet (5 mg total) by mouth daily as needed.      Omeprazole 40 MG Oral Capsule Delayed Release Take 1 capsule (40 mg total) by mouth daily.      Ergocalciferol (VITAMIN D OR) Take 4,000 Int'l Units by mouth daily.      Coenzyme Q10 (COQ-10 OR) Take 1 capsule by mouth daily.      Lactobacillus (PROBIATA OR) Take 1 tablet by mouth daily.      aspirin 81 MG Oral Tab Take 1 tablet (81 mg total) by mouth daily.      Multiple Vitamins-Minerals (MULTIVITAMIN OR) Take 1 tablet by mouth daily.      HIBICLENS 4 % External Solution Apply topically. (Patient not taking: Reported on 8/5/2024)      mupirocin 2 % External Ointment APPLY TOPICALLY A SMALL AMOUNT TO NOSTRIL ONCE DAILY FOR 5 DAYS PRIOR TO SURGERY (Patient not taking: Reported on 8/5/2024)      ondansetron 4 MG Oral Tablet Dispersible Take 1 tablet (4 mg total) by mouth. (Patient not taking: Reported on 8/5/2024)      oxyCODONE 5 MG Oral Tab Take 1 tablet (5 mg total) by mouth every 4 (four) hours as needed. (Patient not taking: Reported on 8/5/2024)      pregabalin 50 MG Oral Cap Take 1 capsule (50 mg total) by  mouth. (Patient not taking: Reported on 8/5/2024)      SENEXON-S 8.6-50 MG Oral Tab Take 2 tablets by mouth daily. (Patient not taking: Reported on 8/5/2024)      traMADol 50 MG Oral Tab Take 1 tablet (50 mg total) by mouth every 6 (six) hours as needed for Pain. (Patient not taking: Reported on 8/5/2024)        Counseling given: Not Answered       REVIEW OF SYSTEMS:   Consitutional: No fevers, chills, sweats  Eye: No recent visual problems  ENMT: No ear pain nasal congestion sore throat  Respiratory: No shortness of breath, cough  Cardiovascular: No chest pain palpitations syncope  Gastrointestinal: No nausea vomiting diarrhea  Genitourinary: No hematuria  Hema/Lymph no bruising tendency, swollen lymph glands       Medical, surgical, family, and social histories were reviewed      EXAM:   VITALS:   Vitals:    08/05/24 1130   BP: 122/78   Pulse: 87   Resp: 18   Temp: 98.6 °F (37 °C)      GENERAL: well developed, well nourished, in no apparent distress  SKIN: no rashes, no suspicious lesions: Cool and Dry  HEENT: atraumatic, normocephalic, ears and throat are clear    Ears: TM's clear and visible bilaterally, no excess cerumen or erythema.   EYES: Pupils equal round and reactive.  Extraocular motions intact no scleral icterus no injection or drainage  THROAT without erythema tonsillar hypertrophy or exudate.  Uvula midline airway patent  NECK: Given midline.  No JVD or lymphadenopathy supple nontender no meningeal signs   LUNGS: clear to auscultation sounds equal bilaterally no wheezes rales or rhonchi  CARDIO: Regular rate and rhythm without murmurs gallops or rubs       ASSESSMENT AND PLAN:   1. Hypercholesteremia  - Lipid Panel; Future  - Comp Metabolic Panel (14); Future  I did discuss with the patient to update his blood work you need a CMP as well as lipid panel he was asked to continue with his simvastatin he was asked to watch his fatty food fried food intake.  2. Essential hypertension  Did discuss with the  patient his blood pressure has remained well-controlled he is currently on losartan hydrochlorothiazide was asked to continue to take these medications as prescribed.    Meds & Refills for this Visit:  Requested Prescriptions      No prescriptions requested or ordered in this encounter       Health Maintenance:  There are no preventive care reminders to display for this patient.    Patient/Caregiver Education: Patient/Caregiver Education: There are no barriers to learning. Medical education done.   Outcome: Patient verbalizes understanding. Patient is notified to call with any questions, complications, allergies, or worsening or changing symptoms.  Patient is to call with any side effects or complications from the treatments as a result of today.     Problem List:  Patient Active Problem List   Diagnosis    Unspecified sinusitis (chronic)    Other bursitis disorders    Pain in joint, shoulder region    Essential hypertension    Hypercholesteremia    Dysphagia    Screening for prostate cancer    Screening, anemia, deficiency, iron    Annual physical exam    Diverticulitis of sigmoid colon    Hernia of abdominal wall    Sinusitis    Overweight    Abnormal screening cardiac CT    Traveler's diarrhea    Anxiety    Left low back pain    Daytime somnolence    Snoring    GONZALO (obstructive sleep apnea)    Disorder of left rotator cuff    Special screening for malignant neoplasm of colon    Primary osteoarthritis of right knee         No follow-ups on file.     Narinder Bales MD    Please note that portions of this note may have been completed with a voice recognition program. Efforts were made to edit the dictations but occasionally words are mis-transcribed.

## 2024-10-28 DIAGNOSIS — I10 ESSENTIAL HYPERTENSION: ICD-10-CM

## 2024-10-28 DIAGNOSIS — E78.00 HYPERCHOLESTEREMIA: ICD-10-CM

## 2024-10-28 RX ORDER — SIMVASTATIN 10 MG
10 TABLET ORAL NIGHTLY
Qty: 90 TABLET | Refills: 0 | Status: SHIPPED | OUTPATIENT
Start: 2024-10-28

## 2024-10-28 RX ORDER — HYDROCHLOROTHIAZIDE 12.5 MG/1
12.5 CAPSULE ORAL DAILY
Qty: 90 CAPSULE | Refills: 0 | Status: SHIPPED | OUTPATIENT
Start: 2024-10-28

## 2024-10-28 RX ORDER — LOSARTAN POTASSIUM AND HYDROCHLOROTHIAZIDE 12.5; 1 MG/1; MG/1
1 TABLET ORAL DAILY
Qty: 90 TABLET | Refills: 0 | Status: SHIPPED | OUTPATIENT
Start: 2024-10-28

## 2024-10-30 NOTE — H&P
HPI:     Jean Marie Bone is a 59 year old male with a PMH of depression, HTN, HL, GONZALO, GERD, herpes, UHR in 2010.    Following for:  1. R spermatocele  2. ED  - 5 mg cialis daily  3. BPH/LUTS    PCP - Nii  LOV 11/2/20    Presents to discuss options for large R spermatocele.  He feels well. Appetite and energy are good    AUA SS is 1/35 with 1 n. Happy with LUTS.  Incontinence: none  Penoscrotal: large right hydrocele. NL L testicle    UA is negative    UTI hx: none  Gross hematuria: none  Tobacco hx: none  Kidney stone hx: none  Fam h/o  malignancy: none    Good potency with 5 mg cialis daily but expensive and would like a coupon.    CT pelvix 11/14/20: R hip arthroplasty. No hernia  Scrotal US 10/5/20: R epi cysts, largest 2 cm. Large complex R fluid collection up to 74 x 57 mm    Discussed options and he would like to proceed to OR for cysto, R hydrocelectomy with possible R partial epididymectomy and spermatocelectomy. We discussed the risks and benefits to the procedure including, but not limited to, bleeding, infection, possible damage to surrounding structures. The patient understands and would like to proceed.    Will continue 5 mg daily cialis for BPH, ED. OR as noted above.    HISTORY:  Past Medical History:    Depressive disorder, not elsewhere classified    Diverticulitis    Herpes zoster without mention of complication    HIGH BLOOD PRESSURE    HIGH CHOLESTEROL    Iron deficiency anemia, unspecified    Obstructive sleep apnea (adult) (pediatric)    AHI 14.7 supine AHI 21 snoring SaO2 dwight 83 % autoPAP 6-20 Premier    Other symptoms involving cardiovascular system    Problems with swallowing    Screening for other and unspecified genitourinary condition    Sleep apnea    Special screening for malignant neoplasm of prostate    Unspecified sinusitis (chronic)    Visual impairment    glasses      Past Surgical History:   Procedure Laterality Date    Appendectomy      Appendectomy      Colonoscopy   08/08    10 year repeat    Hernia surgery  2012    umbilical hernia    Knee replacement surgery  02/29/08    left knee    Total hip replacement  11/30/07    right hip    Total knee replacement      LEFT KNEE      Family History   Problem Relation Age of Onset    Stroke Father     Other (alzheimer's disease) Maternal Grandmother     Stroke Paternal Grandfather     Heart Disease Paternal Grandmother       Social History:   Social History     Socioeconomic History    Marital status:    Tobacco Use    Smoking status: Never    Smokeless tobacco: Never   Vaping Use    Vaping status: Never Used   Substance and Sexual Activity    Alcohol use: Yes     Alcohol/week: 4.0 standard drinks of alcohol     Types: 2 Glasses of wine, 2 Cans of beer per week     Comment: 2 drinks/wk    Drug use: No   Other Topics Concern    Caffeine Concern Yes     Comment: rare     Exercise Yes     Comment: 3 x a week with a       Social Drivers of Health      Received from [facility]    Housing Stability        Medications (Active prior to today's visit):  Current Outpatient Medications   Medication Sig Dispense Refill    tadalafil (CIALIS) 5 MG Oral Tab Take 1 tablet (5 mg total) by mouth daily. 90 tablet 5    hydroCHLOROthiazide 12.5 MG Oral Cap TAKE 1 CAPSULE DAILY 90 capsule 0    Losartan Potassium-HCTZ 100-12.5 MG Oral Tab TAKE 1 TABLET DAILY 90 tablet 0    simvastatin 10 MG Oral Tab TAKE 1 TABLET NIGHTLY 90 tablet 0    Meloxicam 15 MG Oral Tab Take 1 tablet (15 mg total) by mouth daily.      pantoprazole 40 MG Oral Tab EC Take 1 tablet (40 mg total) by mouth every morning before breakfast.      Glucosa-Chondr-Na Chondr--841-658-83 MG Oral Tab Take 2 tablets by mouth daily.      tadalafil 5 MG Oral Tab Take 1 tablet (5 mg total) by mouth daily as needed.      Omeprazole 40 MG Oral Capsule Delayed Release Take 1 capsule (40 mg total) by mouth daily.      Ergocalciferol (VITAMIN D OR) Take 4,000 Int'l  Units by mouth daily.      Coenzyme Q10 (COQ-10 OR) Take 1 capsule by mouth daily.      Lactobacillus (PROBIATA OR) Take 1 tablet by mouth daily.      aspirin 81 MG Oral Tab Take 1 tablet (81 mg total) by mouth daily.      Multiple Vitamins-Minerals (MULTIVITAMIN OR) Take 1 tablet by mouth daily.         Allergies:  Allergies[1]      ROS:     A comprehensive 10 point review of systems was completed.  Pertinent positives and negatives noted in the the HPI.    PHYSICAL EXAM:     GENERAL APPEARANCE: well, developed, well nourished, in no acute distress  NEUROLOGIC: nonfocal, alert and oriented  HEAD: normocephalic, atraumatic  EYES: sclera non-icteric  EARS: hearing intact  ORAL CAVITY: mucosa moist  NECK/THYROID: no obvious goiter or masses  LUNGS: nonlabored breathing  ABDOMEN: soft, no obvious masses or tenderness  SKIN: no obvious rashes    : as noted above    ASSESSMENT/PLAN:   Diagnoses and all orders for this visit:    Urine finding  -     POC Urinalysis, Automated Dip without microscopy (PCA and EMMG ONLY) [16141]    Multiple spermatoceles of right epididymis    BPH with obstruction/lower urinary tract symptoms  -     Discontinue: tadalafil (CIALIS) 5 MG Oral Tab; Take 1 tablet (5 mg total) by mouth daily.  -     Discontinue: tadalafil (CIALIS) 5 MG Oral Tab; Take 1 tablet (5 mg total) by mouth daily.  -     tadalafil (CIALIS) 5 MG Oral Tab; Take 1 tablet (5 mg total) by mouth daily.    Erectile dysfunction, unspecified erectile dysfunction type    - as noted above.    Thanks again for this consult.    Joe Martinez MD, FACS  [facility]rologist  [facility]  Office: [phone]              [1] No Known Allergies

## 2024-11-06 ENCOUNTER — TELEPHONE (OUTPATIENT)
Dept: SURGERY | Facility: CLINIC | Age: 59
End: 2024-11-06

## 2024-11-06 ENCOUNTER — OFFICE VISIT (OUTPATIENT)
Dept: SURGERY | Facility: CLINIC | Age: 59
End: 2024-11-06
Payer: COMMERCIAL

## 2024-11-06 DIAGNOSIS — N43.40 SPERMATOCELE: ICD-10-CM

## 2024-11-06 DIAGNOSIS — N52.9 ERECTILE DYSFUNCTION, UNSPECIFIED ERECTILE DYSFUNCTION TYPE: ICD-10-CM

## 2024-11-06 DIAGNOSIS — N43.3 RIGHT HYDROCELE: Primary | ICD-10-CM

## 2024-11-06 DIAGNOSIS — N13.8 BPH WITH OBSTRUCTION/LOWER URINARY TRACT SYMPTOMS: ICD-10-CM

## 2024-11-06 DIAGNOSIS — R82.90 URINE FINDING: Primary | ICD-10-CM

## 2024-11-06 DIAGNOSIS — N40.1 BPH WITH OBSTRUCTION/LOWER URINARY TRACT SYMPTOMS: ICD-10-CM

## 2024-11-06 DIAGNOSIS — N43.42: ICD-10-CM

## 2024-11-06 LAB
APPEARANCE: CLEAR
BILIRUBIN: NEGATIVE
GLUCOSE (URINE DIPSTICK): NEGATIVE MG/DL
KETONES (URINE DIPSTICK): NEGATIVE MG/DL
LEUKOCYTES: NEGATIVE
MULTISTIX LOT#: NORMAL NUMERIC
NITRITE, URINE: NEGATIVE
OCCULT BLOOD: NEGATIVE
PH, URINE: 6 (ref 4.5–8)
PROTEIN (URINE DIPSTICK): NEGATIVE MG/DL
SPECIFIC GRAVITY: 1.02 (ref 1–1.03)
URINE-COLOR: YELLOW
UROBILINOGEN,SEMI-QN: 0.2 MG/DL (ref 0–1.9)

## 2024-11-06 PROCEDURE — 99244 OFF/OP CNSLTJ NEW/EST MOD 40: CPT | Performed by: UROLOGY

## 2024-11-06 PROCEDURE — 81003 URINALYSIS AUTO W/O SCOPE: CPT | Performed by: UROLOGY

## 2024-11-06 RX ORDER — TADALAFIL 5 MG/1
5 TABLET ORAL DAILY
Qty: 90 TABLET | Refills: 5 | Status: SHIPPED
Start: 2024-11-06 | End: 2024-11-06

## 2024-11-06 RX ORDER — TADALAFIL 5 MG/1
5 TABLET ORAL DAILY
Qty: 90 TABLET | Refills: 5 | Status: SHIPPED
Start: 2024-11-06

## 2024-11-06 RX ORDER — TADALAFIL 5 MG/1
5 TABLET ORAL DAILY
Qty: 90 TABLET | Refills: 5 | Status: SHIPPED | OUTPATIENT
Start: 2024-11-06 | End: 2024-11-06

## 2024-11-06 NOTE — TELEPHONE ENCOUNTER
Please schedule this patient for surgery. He'd like to do this before the end of the year.    ThanksMEGAN    Urology Surgery Request  Surgeon: Michelle  Location (if known): [facility]  Procedure: R hydrocelectomy, R partial epididymectomy with spermatocelectomy   Anesthesia: General   Time Frame: before end of year per pt preference  Time required: 45 minutes  Diagnosis: R hydrocele + spermatocele    Antibiotics: per hospital protocol unless checked below   ___ Levaquin 500 mg IV   ___ Gemcitabine 2 g/100 mL NS bladder instillation to be given in OR    Estimated Post Op/Follow Up Appt: ~ 6 weeks for post-op with PA. Please schedule appointment at time of surgery scheduling.

## 2024-11-08 NOTE — TELEPHONE ENCOUNTER
Spoke with the patient.  Scheduling the surgery for 12/19/24. Also scheduled a  week f/u 1/30/25 @ 11am   Reviewed the pre-op instructions and will send via My Chart or mail to patient once confirmed.  Patient can contact me at [phone]

## 2024-11-24 ENCOUNTER — PATIENT MESSAGE (OUTPATIENT)
Dept: SURGERY | Facility: CLINIC | Age: 59
End: 2024-11-24

## 2024-12-03 ENCOUNTER — LABORATORY ENCOUNTER (OUTPATIENT)
Dept: LAB | Age: 59
End: 2024-12-03
Attending: UROLOGY
Payer: COMMERCIAL

## 2024-12-03 ENCOUNTER — EKG ENCOUNTER (OUTPATIENT)
Dept: LAB | Age: 59
End: 2024-12-03
Attending: UROLOGY
Payer: COMMERCIAL

## 2024-12-03 DIAGNOSIS — N43.3 RIGHT HYDROCELE: ICD-10-CM

## 2024-12-03 DIAGNOSIS — E78.00 HYPERCHOLESTEREMIA: ICD-10-CM

## 2024-12-03 LAB
ALBUMIN SERPL-MCNC: 4.2 G/DL (ref 3.2–4.8)
ALBUMIN/GLOB SERPL: 1.6 {RATIO} (ref 1–2)
ALP LIVER SERPL-CCNC: 70 U/L
ALT SERPL-CCNC: 20 U/L
ANION GAP SERPL CALC-SCNC: 6 MMOL/L (ref 0–18)
ANION GAP SERPL CALC-SCNC: 6 MMOL/L (ref 0–18)
AST SERPL-CCNC: 23 U/L (ref ?–34)
ATRIAL RATE: 78 BPM
BILIRUB SERPL-MCNC: 0.6 MG/DL (ref 0.2–1.1)
BUN BLD-MCNC: 14 MG/DL (ref 9–23)
BUN BLD-MCNC: 14 MG/DL (ref 9–23)
CALCIUM BLD-MCNC: 10.2 MG/DL (ref 8.7–10.4)
CALCIUM BLD-MCNC: 10.2 MG/DL (ref 8.7–10.4)
CHLORIDE SERPL-SCNC: 103 MMOL/L (ref 98–112)
CHLORIDE SERPL-SCNC: 103 MMOL/L (ref 98–112)
CHOLEST SERPL-MCNC: 159 MG/DL (ref ?–200)
CO2 SERPL-SCNC: 28 MMOL/L (ref 21–32)
CO2 SERPL-SCNC: 28 MMOL/L (ref 21–32)
CREAT BLD-MCNC: 0.85 MG/DL
CREAT BLD-MCNC: 0.85 MG/DL
EGFRCR SERPLBLD CKD-EPI 2021: 99 ML/MIN/1.73M2 (ref 60–?)
EGFRCR SERPLBLD CKD-EPI 2021: 99 ML/MIN/1.73M2 (ref 60–?)
FASTING PATIENT LIPID ANSWER: YES
GLOBULIN PLAS-MCNC: 2.7 G/DL (ref 2–3.5)
GLUCOSE BLD-MCNC: 103 MG/DL (ref 70–99)
GLUCOSE BLD-MCNC: 103 MG/DL (ref 70–99)
HDLC SERPL-MCNC: 55 MG/DL (ref 40–59)
LDLC SERPL CALC-MCNC: 88 MG/DL (ref ?–100)
NONHDLC SERPL-MCNC: 104 MG/DL (ref ?–130)
OSMOLALITY SERPL CALC.SUM OF ELEC: 285 MOSM/KG (ref 275–295)
OSMOLALITY SERPL CALC.SUM OF ELEC: 285 MOSM/KG (ref 275–295)
P AXIS: 41 DEGREES
P-R INTERVAL: 142 MS
POTASSIUM SERPL-SCNC: 4.3 MMOL/L (ref 3.5–5.1)
POTASSIUM SERPL-SCNC: 4.3 MMOL/L (ref 3.5–5.1)
PROT SERPL-MCNC: 6.9 G/DL (ref 5.7–8.2)
Q-T INTERVAL: 404 MS
QRS DURATION: 86 MS
QTC CALCULATION (BEZET): 460 MS
R AXIS: -11 DEGREES
SODIUM SERPL-SCNC: 137 MMOL/L (ref 136–145)
SODIUM SERPL-SCNC: 137 MMOL/L (ref 136–145)
T AXIS: 35 DEGREES
TRIGL SERPL-MCNC: 83 MG/DL (ref 30–149)
VENTRICULAR RATE: 78 BPM
VLDLC SERPL CALC-MCNC: 13 MG/DL (ref 0–30)

## 2024-12-03 PROCEDURE — 93005 ELECTROCARDIOGRAM TRACING: CPT

## 2024-12-03 PROCEDURE — 93010 ELECTROCARDIOGRAM REPORT: CPT | Performed by: INTERNAL MEDICINE

## 2024-12-03 PROCEDURE — 36415 COLL VENOUS BLD VENIPUNCTURE: CPT

## 2024-12-03 PROCEDURE — 80053 COMPREHEN METABOLIC PANEL: CPT

## 2024-12-03 PROCEDURE — 80061 LIPID PANEL: CPT

## 2024-12-19 ENCOUNTER — HOSPITAL ENCOUNTER (OUTPATIENT)
Facility: HOSPITAL | Age: 59
Setting detail: HOSPITAL OUTPATIENT SURGERY
Discharge: HOME | End: 2024-12-19
Attending: UROLOGY | Admitting: UROLOGY
Payer: COMMERCIAL

## 2024-12-19 ENCOUNTER — ANESTHESIA (OUTPATIENT)
Dept: SURGERY | Facility: HOSPITAL | Age: 59
End: 2024-12-19
Payer: COMMERCIAL

## 2024-12-19 ENCOUNTER — ANESTHESIA EVENT (OUTPATIENT)
Dept: SURGERY | Facility: HOSPITAL | Age: 59
End: 2024-12-19
Payer: COMMERCIAL

## 2024-12-19 VITALS
OXYGEN SATURATION: 100 % | DIASTOLIC BLOOD PRESSURE: 76 MMHG | HEART RATE: 55 BPM | RESPIRATION RATE: 16 BRPM | HEIGHT: 68 IN | BODY MASS INDEX: 38.4 KG/M2 | TEMPERATURE: 97 F | WEIGHT: 253.38 LBS | SYSTOLIC BLOOD PRESSURE: 134 MMHG

## 2024-12-19 DIAGNOSIS — N43.40 SPERMATOCELE: ICD-10-CM

## 2024-12-19 DIAGNOSIS — N43.3 RIGHT HYDROCELE: Primary | ICD-10-CM

## 2024-12-19 PROCEDURE — 55040 REMOVAL OF HYDROCELE: CPT | Performed by: UROLOGY

## 2024-12-19 PROCEDURE — 0VBJ4ZZ EXCISION OF RIGHT EPIDIDYMIS, PERCUTANEOUS ENDOSCOPIC APPROACH: ICD-10-PCS | Performed by: UROLOGY

## 2024-12-19 PROCEDURE — 0VBF4ZZ EXCISION OF RIGHT SPERMATIC CORD, PERCUTANEOUS ENDOSCOPIC APPROACH: ICD-10-PCS | Performed by: UROLOGY

## 2024-12-19 RX ORDER — LIDOCAINE HYDROCHLORIDE 10 MG/ML
INJECTION, SOLUTION EPIDURAL; INFILTRATION; INTRACAUDAL; PERINEURAL AS NEEDED
Status: DISCONTINUED | OUTPATIENT
Start: 2024-12-19 | End: 2024-12-19 | Stop reason: SURG

## 2024-12-19 RX ORDER — HYDROCODONE BITARTRATE AND ACETAMINOPHEN 5; 325 MG/1; MG/1
1 TABLET ORAL EVERY 6 HOURS PRN
Qty: 30 TABLET | Refills: 0 | Status: SHIPPED | OUTPATIENT
Start: 2024-12-19

## 2024-12-19 RX ORDER — SCOLOPAMINE TRANSDERMAL SYSTEM 1 MG/1
1 PATCH, EXTENDED RELEASE TRANSDERMAL ONCE
Status: DISCONTINUED | OUTPATIENT
Start: 2024-12-19 | End: 2024-12-19 | Stop reason: HOSPADM

## 2024-12-19 RX ORDER — GLYCOPYRROLATE 0.2 MG/ML
INJECTION, SOLUTION INTRAMUSCULAR; INTRAVENOUS AS NEEDED
Status: DISCONTINUED | OUTPATIENT
Start: 2024-12-19 | End: 2024-12-19 | Stop reason: SURG

## 2024-12-19 RX ORDER — BUPIVACAINE HYDROCHLORIDE 5 MG/ML
INJECTION, SOLUTION EPIDURAL; INTRACAUDAL AS NEEDED
Status: DISCONTINUED | OUTPATIENT
Start: 2024-12-19 | End: 2024-12-19 | Stop reason: HOSPADM

## 2024-12-19 RX ORDER — HYDROMORPHONE HYDROCHLORIDE 1 MG/ML
0.4 INJECTION, SOLUTION INTRAMUSCULAR; INTRAVENOUS; SUBCUTANEOUS EVERY 5 MIN PRN
Status: DISCONTINUED | OUTPATIENT
Start: 2024-12-19 | End: 2024-12-19

## 2024-12-19 RX ORDER — CEPHALEXIN 500 MG/1
500 CAPSULE ORAL 2 TIMES DAILY
Qty: 8 CAPSULE | Refills: 0 | Status: SHIPPED | OUTPATIENT
Start: 2024-12-19 | End: 2024-12-23

## 2024-12-19 RX ORDER — ONDANSETRON 2 MG/ML
INJECTION INTRAMUSCULAR; INTRAVENOUS AS NEEDED
Status: DISCONTINUED | OUTPATIENT
Start: 2024-12-19 | End: 2024-12-19 | Stop reason: SURG

## 2024-12-19 RX ORDER — SODIUM CHLORIDE, SODIUM LACTATE, POTASSIUM CHLORIDE, CALCIUM CHLORIDE 600; 310; 30; 20 MG/100ML; MG/100ML; MG/100ML; MG/100ML
INJECTION, SOLUTION INTRAVENOUS CONTINUOUS
Status: DISCONTINUED | OUTPATIENT
Start: 2024-12-19 | End: 2024-12-19

## 2024-12-19 RX ORDER — ACETAMINOPHEN 500 MG
1000 TABLET ORAL ONCE AS NEEDED
Status: DISCONTINUED | OUTPATIENT
Start: 2024-12-19 | End: 2024-12-19

## 2024-12-19 RX ORDER — ROCURONIUM BROMIDE 10 MG/ML
INJECTION, SOLUTION INTRAVENOUS AS NEEDED
Status: DISCONTINUED | OUTPATIENT
Start: 2024-12-19 | End: 2024-12-19 | Stop reason: SURG

## 2024-12-19 RX ORDER — HYDROCODONE BITARTRATE AND ACETAMINOPHEN 5; 325 MG/1; MG/1
2 TABLET ORAL ONCE AS NEEDED
Status: DISCONTINUED | OUTPATIENT
Start: 2024-12-19 | End: 2024-12-19

## 2024-12-19 RX ORDER — LABETALOL HYDROCHLORIDE 5 MG/ML
5 INJECTION, SOLUTION INTRAVENOUS EVERY 5 MIN PRN
Status: DISCONTINUED | OUTPATIENT
Start: 2024-12-19 | End: 2024-12-19

## 2024-12-19 RX ORDER — DOCUSATE SODIUM 100 MG/1
100 CAPSULE, LIQUID FILLED ORAL 2 TIMES DAILY
Qty: 28 CAPSULE | Refills: 0 | Status: SHIPPED | OUTPATIENT
Start: 2024-12-19 | End: 2025-01-02

## 2024-12-19 RX ORDER — HYDROCODONE BITARTRATE AND ACETAMINOPHEN 5; 325 MG/1; MG/1
1 TABLET ORAL ONCE AS NEEDED
Status: DISCONTINUED | OUTPATIENT
Start: 2024-12-19 | End: 2024-12-19

## 2024-12-19 RX ORDER — ACETAMINOPHEN 500 MG
1000 TABLET ORAL ONCE
Status: DISCONTINUED | OUTPATIENT
Start: 2024-12-19 | End: 2024-12-19 | Stop reason: HOSPADM

## 2024-12-19 RX ORDER — HYDROMORPHONE HYDROCHLORIDE 1 MG/ML
0.2 INJECTION, SOLUTION INTRAMUSCULAR; INTRAVENOUS; SUBCUTANEOUS EVERY 5 MIN PRN
Status: DISCONTINUED | OUTPATIENT
Start: 2024-12-19 | End: 2024-12-19

## 2024-12-19 RX ORDER — PROCHLORPERAZINE EDISYLATE 5 MG/ML
5 INJECTION INTRAMUSCULAR; INTRAVENOUS EVERY 8 HOURS PRN
Status: DISCONTINUED | OUTPATIENT
Start: 2024-12-19 | End: 2024-12-19

## 2024-12-19 RX ORDER — MIDAZOLAM HYDROCHLORIDE 1 MG/ML
INJECTION INTRAMUSCULAR; INTRAVENOUS AS NEEDED
Status: DISCONTINUED | OUTPATIENT
Start: 2024-12-19 | End: 2024-12-19 | Stop reason: SURG

## 2024-12-19 RX ORDER — ONDANSETRON 2 MG/ML
4 INJECTION INTRAMUSCULAR; INTRAVENOUS EVERY 6 HOURS PRN
Status: DISCONTINUED | OUTPATIENT
Start: 2024-12-19 | End: 2024-12-19

## 2024-12-19 RX ORDER — NEOSTIGMINE METHYLSULFATE 1 MG/ML
INJECTION INTRAVENOUS AS NEEDED
Status: DISCONTINUED | OUTPATIENT
Start: 2024-12-19 | End: 2024-12-19 | Stop reason: SURG

## 2024-12-19 RX ORDER — NALOXONE HYDROCHLORIDE 0.4 MG/ML
0.08 INJECTION, SOLUTION INTRAMUSCULAR; INTRAVENOUS; SUBCUTANEOUS AS NEEDED
Status: DISCONTINUED | OUTPATIENT
Start: 2024-12-19 | End: 2024-12-19

## 2024-12-19 RX ORDER — HYDROMORPHONE HYDROCHLORIDE 1 MG/ML
0.6 INJECTION, SOLUTION INTRAMUSCULAR; INTRAVENOUS; SUBCUTANEOUS EVERY 5 MIN PRN
Status: DISCONTINUED | OUTPATIENT
Start: 2024-12-19 | End: 2024-12-19

## 2024-12-19 RX ADMIN — MIDAZOLAM HYDROCHLORIDE 2 MG: 1 INJECTION INTRAMUSCULAR; INTRAVENOUS at 07:06:00

## 2024-12-19 RX ADMIN — SODIUM CHLORIDE, SODIUM LACTATE, POTASSIUM CHLORIDE, CALCIUM CHLORIDE: 600; 310; 30; 20 INJECTION, SOLUTION INTRAVENOUS at 08:30:00

## 2024-12-19 RX ADMIN — ONDANSETRON 4 MG: 2 INJECTION INTRAMUSCULAR; INTRAVENOUS at 07:54:00

## 2024-12-19 RX ADMIN — GLYCOPYRROLATE 0.8 MG: 0.2 INJECTION, SOLUTION INTRAMUSCULAR; INTRAVENOUS at 08:17:00

## 2024-12-19 RX ADMIN — SODIUM CHLORIDE, SODIUM LACTATE, POTASSIUM CHLORIDE, CALCIUM CHLORIDE: 600; 310; 30; 20 INJECTION, SOLUTION INTRAVENOUS at 07:01:00

## 2024-12-19 RX ADMIN — NEOSTIGMINE METHYLSULFATE 4 MG: 1 INJECTION INTRAVENOUS at 08:17:00

## 2024-12-19 RX ADMIN — ROCURONIUM BROMIDE 50 MG: 10 INJECTION, SOLUTION INTRAVENOUS at 07:06:00

## 2024-12-19 RX ADMIN — LIDOCAINE HYDROCHLORIDE 50 MG: 10 INJECTION, SOLUTION EPIDURAL; INFILTRATION; INTRACAUDAL; PERINEURAL at 07:06:00

## 2024-12-19 NOTE — DISCHARGE INSTRUCTIONS
You had a procedure called a HYDROCELECTOMY today    - No heavy lifting or strenuous activity for 10 DAYS.    - Shower once daily starting TOMORROW. You should not soak/bathe for 6 WEEKS. No intercourse for 4 WEEKS    - You should remove dressing tomorrow AM. No need to replace. You may remove dressing sooner if you have significant discomfort due to the dressing, difficulty voiding, or if dressing becomes saturated.    - Scrotal support (tight briefs, or jock strap) is recommended to help reduce scrotal swelling/discomfort. You should try to wear this all the time for the next couple weeks (including while sleeping). May remove to shower and change/clean as needed. You can also alternate ice packs/hot packs to the affected area (15 minutes on, 15 minutes off) to help with pain/swelling for the next few days.     - You may experience pain after the procedure for a few days.  If pain becomes intolerable please contact our office or go to the nearest Emergency Room/Immediate Care. You make take over-the counter ibuprofen for mild pain (provided you do not have a medical condition such as stomach ulcers or kidney disease which prohibits you from taking). You may take this in addition to tylenol or narcotic pain medication. Hot packs may help for discomfort as well.    -  You should apply a small amount of bacitracin ointment to your incision twice daily for one week. Then stop.    - If you take blood thinners (such as aspirin or plavix) please DO NOT take them when you go home. You may resume these medications in 4 WEEKS.    - If you are medically allowed to take ibuprofen then you may take 200-400 mg every 8 hours as needed for pain with plenty of fluids. You may take this in addition to tylenol or other pain medication which may be prescribed.     - If you develop a fever > 101 degrees, chills, difficulty urinating or significant abdominal pain in the next 24 hours, call the office.

## 2024-12-19 NOTE — ANESTHESIA PROCEDURE NOTES
Airway  Date/Time: 12/19/2024 7:08 AM  Urgency: elective    Airway not difficult    General Information and Staff    Patient location during procedure: OR  Anesthesiologist: Too Munoz MD  Performed: anesthesiologist   Performed by: Too Munoz MD  Authorized by: Too Munoz MD      Indications and Patient Condition  Indications for airway management: anesthesia  Sedation level: deep  Preoxygenated: yes  Patient position: sniffing  Mask difficulty assessment: 1 - vent by mask    Final Airway Details  Final airway type: endotracheal airway      Successful airway: ETT  Cuffed: yes   Successful intubation technique: Video laryngoscopy  Facilitating devices/methods: intubating stylet  Endotracheal tube insertion site: oral  Blade: GlideScope  Blade size: #4  ETT size (mm): 7.5    Cormack-Lehane Classification: grade I - full view of glottis  Placement verified by: capnometry   Measured from: lips  ETT to lips (cm): 23  Number of attempts at approach: 1

## 2024-12-19 NOTE — SPIRITUAL CARE NOTE
Spiritual Care Visit Note    Patient Name: Jean Marie Bone Date of Spiritual Care Visit: 24   : 1965 Primary Dx: <principal problem not specified>       Referred By:      Spiritual Care Taxonomy:    Intended Effects: Jojo affirmation         Interventions: Lee;Share written prayer;Explain  role;Share words of hope and inspiration    Visit Type/Summary:     - Spiritual Care: Jean Marie is a man of jojo, as is his wife, Felicity, and thanked  for chat and prayers.   remains available as needed for follow up.    Spiritual Care support can be requested via an Epic consult. For urgent/immediate needs, please contact the On Call  at: [facility]: ext [phone]

## 2024-12-19 NOTE — H&P
HPI:     Jean Marie Bone is a 59 year old male with a PMH of depression, HTN, HL, GONZALO, GERD, herpes, UHR in 2010.    Following for:  1. R spermatocele  2. ED  - 5 mg cialis daily  3. BPH/LUTS    PCP - Nii    Presents for large R spermatocelectomy + hydrocelectomy.  He feels well. Appetite and energy are good    AUA SS is 1/35 with 1 n. Happy with LUTS.  Incontinence: none  Penoscrotal: large right hydrocele. NL L testicle    UA is negative    UTI hx: none  Gross hematuria: none  Tobacco hx: none  Kidney stone hx: none  Fam h/o  malignancy: none    Good potency with 5 mg cialis daily but expensive and would like a coupon.    CT pelvix 11/14/20: R hip arthroplasty. No hernia  Scrotal US 10/5/20: R epi cysts, largest 2 cm. Large complex R fluid collection up to 74 x 57 mm    Discussed options and he would like to proceed to OR for cysto, R hydrocelectomy with possible R partial epididymectomy and spermatocelectomy. We discussed the risks and benefits to the procedure including, but not limited to, bleeding, infection, possible damage to surrounding structures. The patient understands and would like to proceed.    Will continue 5 mg daily cialis for BPH, ED. OR as noted above.    HISTORY:  Past Medical History:    Depressive disorder, not elsewhere classified    Diverticulitis    Herpes zoster without mention of complication    HIGH BLOOD PRESSURE    HIGH CHOLESTEROL    Iron deficiency anemia, unspecified    Obstructive sleep apnea (adult) (pediatric)    AHI 14.7 supine AHI 21 snoring SaO2 dwight 83 % autoPAP 6-20 Premier    Osteoarthritis    Other symptoms involving cardiovascular system    Problems with swallowing    reolved with omeprazole    Screening for other and unspecified genitourinary condition    Sleep apnea    Special screening for malignant neoplasm of prostate    Unspecified sinusitis (chronic)    Visual impairment    glasses      Past Surgical History:   Procedure Laterality Date    Appendectomy       Appendectomy      Colonoscopy  08/2008    10 year repeat    Hernia surgery  2012    umbilical hernia    Knee replacement surgery  02/29/2008    left knee    Total hip replacement  11/30/2007    right hip    Total knee replacement Bilateral     LEFT KNEE      Family History   Problem Relation Age of Onset    Stroke Father     Other (alzheimer's disease) Maternal Grandmother     Stroke Paternal Grandfather     Heart Disease Paternal Grandmother       Social History:   Social History     Socioeconomic History    Marital status:    Tobacco Use    Smoking status: Never    Smokeless tobacco: Never   Vaping Use    Vaping status: Never Used   Substance and Sexual Activity    Alcohol use: Yes     Alcohol/week: 2.0 standard drinks of alcohol     Types: 1 Glasses of wine, 1 Cans of beer per week     Comment: occ    Drug use: No   Other Topics Concern    Caffeine Concern Yes     Comment: rare     Exercise Yes     Comment: 3 x a week with a       Social Drivers of Health      Received [facility]    Housing Stability        Medications (Active prior to today's visit):  No current outpatient medications on file.       Allergies:  Allergies[1]      ROS:     A comprehensive 10 point review of systems was completed.  Pertinent positives and negatives noted in the the HPI.    PHYSICAL EXAM:     GENERAL APPEARANCE: well, developed, well nourished, in no acute distress  NEUROLOGIC: nonfocal, alert and oriented  HEAD: normocephalic, atraumatic  EYES: sclera non-icteric  EARS: hearing intact  ORAL CAVITY: mucosa moist  NECK/THYROID: no obvious goiter or masses  LUNGS: nonlabored breathing  ABDOMEN: soft, no obvious masses or tenderness  SKIN: no obvious rashes    : as noted above    ASSESSMENT/PLAN:   Diagnoses and all orders for this visit:    Right hydrocele  -     EKG 12 Lead; Future  -     Basic Metabolic Panel (8); Future    Other orders  -     Vital signs; Standing  -     Notify anesthesia  vital signs; Standing  -     PAT to instruct patients; Standing  -     acetaminophen (Tylenol Extra Strength) tab 1,000 mg  -     scopolamine (Transderm-Scop) 1 MG/3DAYS patch 1 patch  -     NPO; Standing  -     Insert/Maintain PIV; Standing  -     lactated ringers infusion  -     Activity as tolerated Until Discontinued; Standing  -     Height and weight; Standing  -     Initiate adult preop prophylactic antibiotic protocol; Standing  -     Verify informed consent; Standing  -     ceFAZolin (Ancef) 2g in 10mL IV syringe premix    - as noted above.    Thanks again for this consult.    Joe Martinez MD, FACS  Urologist  [facility]  Office: [phone]              [1] No Known Allergies

## 2024-12-19 NOTE — OPERATIVE REPORT
Urology Operative Note    Attending Surgeon: Joe Martinez MD    Patient Name: Jean Marie Bone    Date of Surgery: 12/19/2024    Preoperative Diagnosis: right hydrocele and spermatocele    Postoperative Diagnosis: same    Procedure Performed: RIGHT HYDROCELECTOMY  RIGHT PARTIAL EPIDIDYMECTOMY WITH SPERMATOCELECTOMY     Indication for procedure:  Patient is a 59 year old male who presented with a right hydrocele and spermatocele. The patient was counseled on options and elected to undergo the aforementioned procedure. We discussed the risks and benefits to surgery. We discussed risks including, but not limited to, bleeding, infection, possible damage to surrounding structures. The patient understood these risks and wished to proceed with surgery.  Description of the procedure:  The patient was taken to the operating room and prepped and draped in supine position after undergoing general anesthesia.   A 3 cm incision was made in the right hemiscrotum overlying the right testicle. I dissected down to the hydrocele sac. The testicle and hydrocele were delivered from the scrotum. I then incised the hydrocele sac and the fluid was drained. A portion of the hydrocele sac was then excised and sent to pathology. I identified the spermatocele and dissected down to the base. The spermatocele was completely excised and the base was fulgurated. The remnants of the hydrocele sac were then flipped behind the testicle and this was stitched in place with a running 3 0 Vicryl suture. Attention was made to ensure we did not strangulate the spermatic cord. Meticulous hemostasis was achieved at this point to ensure there were no oozing vessels. The wound was irrigated with sterile saline. There was no bleeding noted at the end of the case. The testicle was well perfused and placed back in its normal anatomic position within the scrotum. The testicle was pexied in place using 3 0 Vicryl suture. The dartos fascia was then reapproximated  using a running 3 0 Vicryl suture and the skin was closed using a 2 0 chromic in a vertical mattress fashion. Marcaine was applied to the wound as well as a small amount of bacitracin ointment. The scrotum was wrapped in a Kerlix wrap and an athletic supporter was used as well.  The patient was awoken having tolerated the procedure well.    Specimen:   ID Type Source Tests Collected by Time Destination   1 : right spermatocele Tissue Scrotum SURGICAL PATHOLOGY TISSUE Joe Martinez MD 12/19/2024  7:48 AM        Complications: No known complications    Condition on Discharge from the operating room was stable    Plan: The patient will follow up in a few weeks for a wound check.    Joe Martinez MD  Date: 12/19/2024  Time: 8:43 AM

## 2024-12-19 NOTE — ANESTHESIA PREPROCEDURE EVALUATION
PRE-OP EVALUATION    Patient Name: Jean Marie Bone    Admit Diagnosis: Right hydrocele [N43.3]  Spermatocele [N43.40]    Pre-op Diagnosis: Right hydrocele [N43.3]  Spermatocele [N43.40]    RIGHT HYDROCELECTOMY  RIGHT PARTIAL EPIDIDYMECTOMY WITH SPERMATOCELECTOMY    Anesthesia Procedure: RIGHT HYDROCELECTOMY RIGHT PARTIAL EPIDIDYMECTOMY WITH SPERMATOCELECTOMY (Right: Scrotum)    Surgeons and Role:     * Joe Martinez MD - Primary    Pre-op vitals reviewed.  Temp: 98.3 °F (36.8 °C)  Pulse: 72  Resp: 16  BP: 154/88  SpO2: 96 %  Body mass index is 38.53 kg/m².    Current medications reviewed.  Hospital Medications:   acetaminophen (Tylenol Extra Strength) tab 1,000 mg  1,000 mg Oral Once    scopolamine (Transderm-Scop) 1 MG/3DAYS patch 1 patch  1 patch Transdermal Once    lactated ringers infusion   Intravenous Continuous    ceFAZolin (Ancef) 2g in 10mL IV syringe premix  2 g Intravenous Once       Outpatient Medications:   Prescriptions Prior to Admission[1]    Allergies: Patient has no known allergies.      Anesthesia Evaluation    Patient summary reviewed.    Anesthetic Complications  (-) history of anesthetic complications         GI/Hepatic/Renal                                 Cardiovascular        Exercise tolerance: good     MET: >4    (+) obesity  (+) hypertension     (-) CAD  (-) past MI                              Endo/Other      (-) diabetes                            Pulmonary      (-) asthma           (-) recent URI   (+) sleep apnea and CPAP      Neuro/Psych          (-) CVA   (-) TIA                         Past Surgical History:   Procedure Laterality Date    Appendectomy      Appendectomy      Colonoscopy  08/2008    10 year repeat    Hernia surgery  2012    umbilical hernia    Knee replacement surgery  02/29/2008    left knee    Total hip replacement  11/30/2007    right hip    Total knee replacement Bilateral     LEFT KNEE     Social History     Socioeconomic History    Marital status:     Tobacco Use    Smoking status: Never    Smokeless tobacco: Never   Vaping Use    Vaping status: Never Used   Substance and Sexual Activity    Alcohol use: Yes     Alcohol/week: 2.0 standard drinks of alcohol     Types: 1 Glasses of wine, 1 Cans of beer per week     Comment: occ    Drug use: No   Other Topics Concern    Caffeine Concern Yes     Comment: rare     Exercise Yes     Comment: 3 x a week with a       History   Drug Use No     Available pre-op labs reviewed.     Lab Results   Component Value Date     12/03/2024     12/03/2024    K 4.3 12/03/2024    K 4.3 12/03/2024     12/03/2024     12/03/2024    CO2 28.0 12/03/2024    CO2 28.0 12/03/2024    BUN 14 12/03/2024    BUN 14 12/03/2024    CREATSERUM 0.85 12/03/2024    CREATSERUM 0.85 12/03/2024     (H) 12/03/2024     (H) 12/03/2024    CA 10.2 12/03/2024    CA 10.2 12/03/2024            Airway      Mallampati: III  Mouth opening: 3 FB  TM distance: > 6 cm  Neck ROM: full Cardiovascular      Rhythm: regular       Dental    Dentition appears grossly intact         Pulmonary    Pulmonary exam normal.                 Other findings              ASA: 3   Plan: general  NPO status verified and     Post-procedure pain management plan discussed with surgeon and patient.      Plan/risks discussed with: patient                Present on Admission:  **None**             [1]   Medications Prior to Admission   Medication Sig Dispense Refill Last Dose/Taking    Omega-3 Fatty Acids (FISH OIL OR) Take by mouth.   12/5/2024    tadalafil (CIALIS) 5 MG Oral Tab Take 1 tablet (5 mg total) by mouth daily. 90 tablet 5 12/18/2024 Morning    hydroCHLOROthiazide 12.5 MG Oral Cap TAKE 1 CAPSULE DAILY 90 capsule 0 12/19/2024 at  2:30 AM    Losartan Potassium-HCTZ 100-12.5 MG Oral Tab TAKE 1 TABLET DAILY 90 tablet 0 12/18/2024 Morning    simvastatin 10 MG Oral Tab TAKE 1 TABLET NIGHTLY 90 tablet 0 12/18/2024 Bedtime    Omeprazole 40 MG Oral  Capsule Delayed Release Take 1 capsule (40 mg total) by mouth daily.   12/18/2024 Morning    Ergocalciferol (VITAMIN D OR) Take 4,000 Int'l Units by mouth daily.   12/5/2024    Coenzyme Q10 (COQ-10 OR) Take 1 capsule by mouth daily.   12/5/2024    Lactobacillus (PROBIATA OR) Take 1 tablet by mouth daily.   12/5/2024    aspirin 81 MG Oral Tab Take 1 tablet (81 mg total) by mouth daily.   12/5/2024    Multiple Vitamins-Minerals (MULTIVITAMIN OR) Take 1 tablet by mouth daily.   12/5/2024

## 2024-12-19 NOTE — ANESTHESIA POSTPROCEDURE EVALUATION
[facility]    Jean Marie Bone Patient Status:  Hospital Outpatient Surgery   Age/Gender 59 year old male MRN [id number]   Location [facility] POST ANESTHESIA CARE UNIT Attending Joe Martinez MD   Hosp Day # 0 PCP Narinder Bales MD       Anesthesia Post-op Note    RIGHT HYDROCELECTOMY  RIGHT PARTIAL EPIDIDYMECTOMY WITH SPERMATOCELECTOMY    Procedure Summary       Date: 12/19/24 Room / Location:  MAIN OR 04 /  MAIN OR    Anesthesia Start: 0701 Anesthesia Stop: 0830    Procedure: RIGHT HYDROCELECTOMY RIGHT PARTIAL EPIDIDYMECTOMY WITH SPERMATOCELECTOMY (Right: Scrotum) Diagnosis:       Right hydrocele      Spermatocele      (Right hydrocele [N43.3]Spermatocele [N43.40])    Surgeons: Joe Martinez MD Anesthesiologist: Too Munoz MD    Anesthesia Type: general ASA Status: 3            Anesthesia Type: general    Vitals Value Taken Time   /88 12/19/24 0845   Temp 97.8 °F (36.6 °C) 12/19/24 0828   Pulse 75 12/19/24 0848   Resp 18 12/19/24 0848   SpO2 96 % 12/19/24 0848   Vitals shown include unfiled device data.    Patient Location: PACU    Anesthesia Type: general    Airway Patency: patent    Postop Pain Control: adequate    Mental Status: preanesthetic baseline    Nausea/Vomiting: none    Cardiopulmonary/Hydration status: stable euvolemic    Complications: no apparent anesthesia related complications    Postop vital signs: stable    Dental Exam: Unchanged from Preop    Patient to be discharged from PACU when criteria met.

## 2025-01-10 ENCOUNTER — OFFICE VISIT (OUTPATIENT)
Dept: FAMILY MEDICINE CLINIC | Facility: CLINIC | Age: 60
End: 2025-01-10
Payer: COMMERCIAL

## 2025-01-10 ENCOUNTER — LAB ENCOUNTER (OUTPATIENT)
Dept: LAB | Age: 60
End: 2025-01-10
Attending: FAMILY MEDICINE
Payer: COMMERCIAL

## 2025-01-10 VITALS
HEIGHT: 68 IN | SYSTOLIC BLOOD PRESSURE: 128 MMHG | BODY MASS INDEX: 38.95 KG/M2 | WEIGHT: 257 LBS | RESPIRATION RATE: 18 BRPM | HEART RATE: 71 BPM | TEMPERATURE: 97 F | DIASTOLIC BLOOD PRESSURE: 76 MMHG

## 2025-01-10 DIAGNOSIS — E78.00 HYPERCHOLESTEREMIA: ICD-10-CM

## 2025-01-10 DIAGNOSIS — Z12.5 SCREENING FOR MALIGNANT NEOPLASM OF PROSTATE: ICD-10-CM

## 2025-01-10 DIAGNOSIS — Z00.00 HEALTHCARE MAINTENANCE: ICD-10-CM

## 2025-01-10 DIAGNOSIS — I10 ESSENTIAL HYPERTENSION: ICD-10-CM

## 2025-01-10 LAB
BASOPHILS # BLD AUTO: 0.07 X10(3) UL (ref 0–0.2)
BASOPHILS NFR BLD AUTO: 1.8 %
COMPLEXED PSA SERPL-MCNC: 1.48 NG/ML (ref ?–4)
EOSINOPHIL # BLD AUTO: 0.17 X10(3) UL (ref 0–0.7)
EOSINOPHIL NFR BLD AUTO: 4.3 %
ERYTHROCYTE [DISTWIDTH] IN BLOOD BY AUTOMATED COUNT: 13.9 %
HCT VFR BLD AUTO: 40.6 %
HGB BLD-MCNC: 13.1 G/DL
IMM GRANULOCYTES # BLD AUTO: 0.01 X10(3) UL (ref 0–1)
IMM GRANULOCYTES NFR BLD: 0.3 %
LYMPHOCYTES # BLD AUTO: 1.68 X10(3) UL (ref 1–4)
LYMPHOCYTES NFR BLD AUTO: 43 %
MCH RBC QN AUTO: 29.1 PG (ref 26–34)
MCHC RBC AUTO-ENTMCNC: 32.3 G/DL (ref 31–37)
MCV RBC AUTO: 90.2 FL
MONOCYTES # BLD AUTO: 0.44 X10(3) UL (ref 0.1–1)
MONOCYTES NFR BLD AUTO: 11.3 %
NEUTROPHILS # BLD AUTO: 1.54 X10 (3) UL (ref 1.5–7.7)
NEUTROPHILS # BLD AUTO: 1.54 X10(3) UL (ref 1.5–7.7)
NEUTROPHILS NFR BLD AUTO: 39.3 %
PLATELET # BLD AUTO: 251 10(3)UL (ref 150–450)
RBC # BLD AUTO: 4.5 X10(6)UL
T3 SERPL-MCNC: 1.1 NG/ML (ref 0.6–1.81)
T4 FREE SERPL-MCNC: 1.2 NG/DL (ref 0.8–1.7)
TSI SER-ACNC: 2.74 UIU/ML (ref 0.55–4.78)
WBC # BLD AUTO: 3.9 X10(3) UL (ref 4–11)

## 2025-01-10 PROCEDURE — 84439 ASSAY OF FREE THYROXINE: CPT | Performed by: FAMILY MEDICINE

## 2025-01-10 PROCEDURE — 84443 ASSAY THYROID STIM HORMONE: CPT | Performed by: FAMILY MEDICINE

## 2025-01-10 PROCEDURE — 85025 COMPLETE CBC W/AUTO DIFF WBC: CPT | Performed by: FAMILY MEDICINE

## 2025-01-10 PROCEDURE — 84480 ASSAY TRIIODOTHYRONINE (T3): CPT | Performed by: FAMILY MEDICINE

## 2025-01-10 PROCEDURE — 84153 ASSAY OF PSA TOTAL: CPT | Performed by: FAMILY MEDICINE

## 2025-01-10 NOTE — PROGRESS NOTES
[facility] Family Medicine Office Note  Chief Complaint:   Chief Complaint   Patient presents with    Physical    Lab Results     Labs completed on 12/03/2024       HPI:   This is a 59 year old male coming in for physical exam as well as lab review.  The patient states that he has been doing well denies any acute concerns today.  He does have a history of hypercholesterolemia hypertension.  Denies any chest pain nausea vomiting diarrhea constipation.      Past Medical History:    Depressive disorder, not elsewhere classified    Diverticulitis    Herpes zoster without mention of complication    HIGH BLOOD PRESSURE    HIGH CHOLESTEROL    Iron deficiency anemia, unspecified    Obstructive sleep apnea (adult) (pediatric)    AHI 14.7 supine AHI 21 snoring SaO2 dwight 83 % autoPAP 6-20 Premier    Osteoarthritis    Other symptoms involving cardiovascular system    Problems with swallowing    reolved with omeprazole    Screening for other and unspecified genitourinary condition    Sleep apnea    Special screening for malignant neoplasm of prostate    Unspecified sinusitis (chronic)    Visual impairment    glasses     Past Surgical History:   Procedure Laterality Date    Appendectomy      Appendectomy      Colonoscopy  08/2008    10 year repeat    Hernia surgery  2012    umbilical hernia    Knee replacement surgery  02/29/2008    left knee    Total hip replacement  11/30/2007    right hip    Total knee replacement Bilateral     LEFT KNEE     Social History:  Social History     Socioeconomic History    Marital status:    Tobacco Use    Smoking status: Never    Smokeless tobacco: Never   Vaping Use    Vaping status: Never Used   Substance and Sexual Activity    Alcohol use: Yes     Alcohol/week: 2.0 standard drinks of alcohol     Types: 1 Glasses of wine, 1 Cans of beer per week     Comment: occ    Drug use: No   Other Topics Concern    Caffeine Concern Yes     Comment: rare     Exercise Yes     Comment: 3 x a  week with a       Social Drivers of Health      Received from [facility]    Housing Stability     Family History:  Family History   Problem Relation Age of Onset    Stroke Father     Other (alzheimer's disease) Maternal Grandmother     Stroke Paternal Grandfather     Heart Disease Paternal Grandmother      Allergies:  Allergies[1]  Current Meds:  Current Outpatient Medications   Medication Sig Dispense Refill    Omega-3 Fatty Acids (FISH OIL OR) Take by mouth.      tadalafil (CIALIS) 5 MG Oral Tab Take 1 tablet (5 mg total) by mouth daily. 90 tablet 5    hydroCHLOROthiazide 12.5 MG Oral Cap TAKE 1 CAPSULE DAILY 90 capsule 0    Losartan Potassium-HCTZ 100-12.5 MG Oral Tab TAKE 1 TABLET DAILY 90 tablet 0    simvastatin 10 MG Oral Tab TAKE 1 TABLET NIGHTLY 90 tablet 0    Omeprazole 40 MG Oral Capsule Delayed Release Take 1 capsule (40 mg total) by mouth daily.      Ergocalciferol (VITAMIN D OR) Take 4,000 Int'l Units by mouth daily.      Coenzyme Q10 (COQ-10 OR) Take 1 capsule by mouth daily.      Lactobacillus (PROBIATA OR) Take 1 tablet by mouth daily.      Multiple Vitamins-Minerals (MULTIVITAMIN OR) Take 1 tablet by mouth daily.        Counseling given: Not Answered       REVIEW OF SYSTEMS:   Consitutional: No fevers, chills, sweats  Eye: No recent visual problems  ENMT: No ear pain nasal congestion sore throat  Respiratory: No shortness of breath, cough  Cardiovascular: No chest pain palpitations syncope  Gastrointestinal: No nausea vomiting diarrhea  Genitourinary: No hematuria  Hema/Lymph no bruising tendency, swollen lymph glands  Endocrine: Negative for excessive thirst excessive hunger  Musculoskeletal: No back pain neck pain joint pain muscle pain decreased range of motion  Integumentary: No rash, pruritus, abrasions  Neurologic: Alert and oriented x4  Psychiatric: No anxiety, depression    Medical, surgical, family, and social histories were reviewed      EXAM:   VITALS:   Vitals:     01/10/25 0807   BP: 128/76   Pulse: 71   Resp: 18   Temp: 97.3 °F (36.3 °C)      GENERAL: well developed, well nourished, in no apparent distress  SKIN: no rashes, no suspicious lesions: Cool and Dry  HEENT: atraumatic, normocephalic, ears and throat are clear    Ears: TM's clear and visible bilaterally, no excess cerumen or erythema.   EYES: Pupils equal round and reactive.  Extraocular motions intact no scleral icterus no injection or drainage  THROAT without erythema tonsillar hypertrophy or exudate.  Uvula midline airway patent  NECK: Given midline.  No JVD or lymphadenopathy supple nontender no meningeal signs   LUNGS: clear to auscultation sounds equal bilaterally no wheezes rales or rhonchi  CARDIO: Regular rate and rhythm without murmurs gallops or rubs  GI: Soft nontender nondistended no hepatomegaly palpable masses.  No guarding.   without deformity or crepitus no flank tenderness  EXTREMITIES: no cyanosis, clubbing or edema no joint tenderness effusion or edema noted.  No calf tenderness negative Homans' sign bilaterally  NEURO: Awake and alert.  Cranial nerves II through XII intact motor and sensory grossly within normal limits.  5 out of 5 muscle strength in all muscle groups.  Normal speech.  PSYCHIATRIC: Awake, alert and oriented x3, cooperative appropriate mood and affect.  Judgment intact       ASSESSMENT AND PLAN:   1. Healthcare maintenance  I did discuss with the patient at this time would suggest to update CBC TSH T3-T4 as well as PSA he is up-to-date with all other preventative measures he was asked to follow-up yearly for regular physical exams or for any other acute concern.  2. Hypercholesteremia  - Triiodothyronine (T3) Total; Future  - TSH and Free T4; Future  - CBC With Differential With Platelet; Future  I did discuss with the patient his LDL as well as triglycerides remain well-controlled he is currently on simvastatin he was asked to continue to watch his diet decrease  fatty food fried food intake we will update blood work in the next 6 months CMP lipid panel  3. Essential hypertension  Patient's blood pressure has remained well-controlled he is currently on losartan he was asked to continue with the medication.  4. Screening for malignant neoplasm of prostate  - PSA Total, Screen; Future       Meds & Refills for this Visit:  Requested Prescriptions      No prescriptions requested or ordered in this encounter       Health Maintenance:  Health Maintenance Due   Topic Date Due    Pneumococcal Vaccine: 50+ Years (1 of 1 - PCV) Never done    COVID-19 Vaccine (4 - 2024-25 season) 09/01/2024    Influenza Vaccine (1) 10/01/2024    Annual Depression Screening  01/01/2025    Annual Physical  01/09/2025       Patient/Caregiver Education: Patient/Caregiver Education: There are no barriers to learning. Medical education done.   Outcome: Patient verbalizes understanding. Patient is notified to call with any questions, complications, allergies, or worsening or changing symptoms.  Patient is to call with any side effects or complications from the treatments as a result of today.     Problem List:  Patient Active Problem List   Diagnosis    Unspecified sinusitis (chronic)    Other bursitis disorders    Pain in joint, shoulder region    Essential hypertension    Hypercholesteremia    Dysphagia    Screening for prostate cancer    Screening, anemia, deficiency, iron    Annual physical exam    Diverticulitis of sigmoid colon    Hernia of abdominal wall    Sinusitis    Overweight    Abnormal screening cardiac CT    Traveler's diarrhea    Anxiety    Left low back pain    Daytime somnolence    Snoring    GONZALO (obstructive sleep apnea)    Disorder of left rotator cuff    Special screening for malignant neoplasm of colon    Primary osteoarthritis of right knee         No follow-ups on file.     Narinder Bales MD    Please note that portions of this note may have been completed with a voice recognition  program. Efforts were made to edit the dictations but occasionally words are mis-transcribed.       [1] No Known Allergies

## 2025-01-23 ENCOUNTER — OFFICE VISIT (OUTPATIENT)
Dept: SURGERY | Facility: CLINIC | Age: 60
End: 2025-01-23
Payer: COMMERCIAL

## 2025-01-23 DIAGNOSIS — Z98.890 HISTORY OF HYDROCELECTOMY: Primary | ICD-10-CM

## 2025-01-23 DIAGNOSIS — Z12.5 SCREENING FOR PROSTATE CANCER: ICD-10-CM

## 2025-01-23 LAB
APPEARANCE: CLEAR
BILIRUBIN: NEGATIVE
GLUCOSE (URINE DIPSTICK): NEGATIVE MG/DL
KETONES (URINE DIPSTICK): NEGATIVE MG/DL
LEUKOCYTES: NEGATIVE
MULTISTIX LOT#: NORMAL NUMERIC
NITRITE, URINE: NEGATIVE
OCCULT BLOOD: NEGATIVE
PH, URINE: 7 (ref 4.5–8)
PROTEIN (URINE DIPSTICK): NEGATIVE MG/DL
SPECIFIC GRAVITY: 1.01 (ref 1–1.03)
UROBILINOGEN,SEMI-QN: 0.2 MG/DL (ref 0–1.9)

## 2025-01-23 PROCEDURE — 99024 POSTOP FOLLOW-UP VISIT: CPT | Performed by: PHYSICIAN ASSISTANT

## 2025-01-23 PROCEDURE — 81003 URINALYSIS AUTO W/O SCOPE: CPT | Performed by: PHYSICIAN ASSISTANT

## 2025-01-23 NOTE — PROGRESS NOTES
Subjective:   Jean Marie Bone is a 59 year old male with hx of depression, HTN, HL, GONZALO, GERD, herpes, UHR in 2010, who presents for post op check following right hydrocelectomy with Dr. Martinez on 12/19/24.    He is doing well, swelling improved. No drainage or pain.   Happy with results.     History/Other:    Chief Complaint Reviewed and Verified  Nursing Notes Reviewed and   Verified  Allergies Reviewed  Medications Reviewed         Current Outpatient Medications   Medication Sig Dispense Refill    Omega-3 Fatty Acids (FISH OIL OR) Take by mouth.      tadalafil (CIALIS) 5 MG Oral Tab Take 1 tablet (5 mg total) by mouth daily. 90 tablet 5    hydroCHLOROthiazide 12.5 MG Oral Cap TAKE 1 CAPSULE DAILY 90 capsule 0    Losartan Potassium-HCTZ 100-12.5 MG Oral Tab TAKE 1 TABLET DAILY 90 tablet 0    simvastatin 10 MG Oral Tab TAKE 1 TABLET NIGHTLY 90 tablet 0    Omeprazole 40 MG Oral Capsule Delayed Release Take 1 capsule (40 mg total) by mouth daily.      Ergocalciferol (VITAMIN D OR) Take 4,000 Int'l Units by mouth daily.      Coenzyme Q10 (COQ-10 OR) Take 1 capsule by mouth daily.      Lactobacillus (PROBIATA OR) Take 1 tablet by mouth daily.      Multiple Vitamins-Minerals (MULTIVITAMIN OR) Take 1 tablet by mouth daily.         Review of Systems:  Pertinent items are noted in HPI.      Objective:   There were no vitals taken for this visit. Estimated body mass index is 39.08 kg/m² as calculated from the following:    Height as of 1/10/25: 5' 8\" (1.727 m).    Weight as of 1/10/25: 257 lb (116.6 kg).    No distress  Non labored respirations  : right hemiscrotal incision well healed  Mild edema remains, non tender    Laboratory Data:  Lab Results   Component Value Date    WBC 3.9 (L) 01/10/2025    HGB 13.1 01/10/2025    .0 01/10/2025     Lab Results   Component Value Date     12/03/2024     12/03/2024    K 4.3 12/03/2024    K 4.3 12/03/2024     12/03/2024     12/03/2024    CO2 28.0  12/03/2024    CO2 28.0 12/03/2024    BUN 14 12/03/2024    BUN 14 12/03/2024     (H) 12/03/2024     (H) 12/03/2024    GFRAA 90 05/17/2022    AST 23 12/03/2024    ALT 20 12/03/2024    TP 6.9 12/03/2024    ALB 4.2 12/03/2024    CA 10.2 12/03/2024    CA 10.2 12/03/2024       Urinalysis Results (last three years):  Recent Labs     10/17/22  1123 01/09/24  1051 11/06/24  1327 01/23/25  1054   COLORUR  --  Light-Yellow  --   --    CLARITY  --  Clear  --   --    SPECGRAVITY 1.020 1.019 1.020 1.015   PHURINE 7.5 7.0 6.0 7.0   PROUR  --  Negative  --   --    GLUUR  --  Normal  --   --    KETUR  --  Negative  --   --    BILUR  --  Negative  --   --    BLOODURINE  --  Negative  --   --    NITRITE Negative Negative Negative Negative   UROBILINOGEN  --  Normal  --   --    LEUUR  --  Negative  --   --        Urine Culture Results (last three years):  No results found for: \"URINECUL\"    Imaging  No results found.    Assessment & Plan:   Right hydrocelectomy 12/19/24  Doing well, healed  No restrictions  Follow up prn      Joie Fisher PA-C, 1/23/2025, 11:28 AM

## 2025-01-26 DIAGNOSIS — E78.00 HYPERCHOLESTEREMIA: ICD-10-CM

## 2025-01-26 DIAGNOSIS — I10 ESSENTIAL HYPERTENSION: ICD-10-CM

## 2025-01-28 RX ORDER — LOSARTAN POTASSIUM AND HYDROCHLOROTHIAZIDE 12.5; 1 MG/1; MG/1
1 TABLET ORAL DAILY
Qty: 90 TABLET | Refills: 0 | Status: SHIPPED | OUTPATIENT
Start: 2025-01-28

## 2025-01-28 RX ORDER — HYDROCHLOROTHIAZIDE 12.5 MG/1
12.5 CAPSULE ORAL DAILY
Qty: 90 CAPSULE | Refills: 0 | Status: SHIPPED | OUTPATIENT
Start: 2025-01-28

## 2025-01-28 RX ORDER — SIMVASTATIN 10 MG
10 TABLET ORAL NIGHTLY
Qty: 90 TABLET | Refills: 0 | Status: SHIPPED | OUTPATIENT
Start: 2025-01-28

## 2025-05-18 DIAGNOSIS — I10 ESSENTIAL HYPERTENSION: ICD-10-CM

## 2025-05-18 DIAGNOSIS — E78.00 HYPERCHOLESTEREMIA: ICD-10-CM

## 2025-05-19 DIAGNOSIS — I10 ESSENTIAL HYPERTENSION: ICD-10-CM

## 2025-05-21 DIAGNOSIS — I10 ESSENTIAL HYPERTENSION: ICD-10-CM

## 2025-05-21 DIAGNOSIS — E78.00 HYPERCHOLESTEREMIA: ICD-10-CM

## 2025-05-22 ENCOUNTER — PATIENT MESSAGE (OUTPATIENT)
Dept: FAMILY MEDICINE CLINIC | Facility: CLINIC | Age: 60
End: 2025-05-22

## 2025-05-22 RX ORDER — LOSARTAN POTASSIUM AND HYDROCHLOROTHIAZIDE 12.5; 1 MG/1; MG/1
1 TABLET ORAL DAILY
Qty: 30 TABLET | Refills: 0 | Status: SHIPPED | OUTPATIENT
Start: 2025-05-22

## 2025-05-22 RX ORDER — LOSARTAN POTASSIUM AND HYDROCHLOROTHIAZIDE 12.5; 1 MG/1; MG/1
1 TABLET ORAL DAILY
Qty: 90 TABLET | Refills: 0 | Status: SHIPPED | OUTPATIENT
Start: 2025-05-22

## 2025-05-22 RX ORDER — SIMVASTATIN 10 MG
10 TABLET ORAL NIGHTLY
Qty: 90 TABLET | Refills: 0 | OUTPATIENT
Start: 2025-05-22

## 2025-05-22 RX ORDER — HYDROCHLOROTHIAZIDE 12.5 MG/1
12.5 CAPSULE ORAL DAILY
Qty: 90 CAPSULE | Refills: 0 | Status: SHIPPED | OUTPATIENT
Start: 2025-05-22

## 2025-05-22 RX ORDER — SIMVASTATIN 10 MG
10 TABLET ORAL NIGHTLY
Qty: 90 TABLET | Refills: 0 | Status: SHIPPED | OUTPATIENT
Start: 2025-05-22

## 2025-05-22 RX ORDER — HYDROCHLOROTHIAZIDE 12.5 MG/1
12.5 CAPSULE ORAL DAILY
Qty: 90 CAPSULE | Refills: 0 | OUTPATIENT
Start: 2025-05-22

## 2025-05-22 NOTE — TELEPHONE ENCOUNTER
Last Office Visit: 1/10/25  Last Refill:  Return to Clinic: 6 months   Protocol: passed  NOV: n/a   Requested Prescriptions     Pending Prescriptions Disp Refills    Losartan Potassium-HCTZ 100-12.5 MG Oral Tab 30 tablet 0     Sig: Take 1 tablet by mouth daily.         Please approve if appropriate.     Thank you!

## 2025-07-27 DIAGNOSIS — N40.1 BPH WITH OBSTRUCTION/LOWER URINARY TRACT SYMPTOMS: ICD-10-CM

## 2025-07-27 DIAGNOSIS — N13.8 BPH WITH OBSTRUCTION/LOWER URINARY TRACT SYMPTOMS: ICD-10-CM

## 2025-07-28 RX ORDER — TADALAFIL 5 MG/1
5 TABLET ORAL DAILY
Qty: 90 TABLET | Refills: 5 | Status: SHIPPED | OUTPATIENT
Start: 2025-07-28

## 2025-08-22 DIAGNOSIS — I10 ESSENTIAL HYPERTENSION: ICD-10-CM

## 2025-08-22 DIAGNOSIS — E78.00 HYPERCHOLESTEREMIA: ICD-10-CM

## 2025-08-25 DIAGNOSIS — N13.8 BPH WITH OBSTRUCTION/LOWER URINARY TRACT SYMPTOMS: ICD-10-CM

## 2025-08-25 DIAGNOSIS — I10 ESSENTIAL HYPERTENSION: ICD-10-CM

## 2025-08-25 DIAGNOSIS — N40.1 BPH WITH OBSTRUCTION/LOWER URINARY TRACT SYMPTOMS: ICD-10-CM

## 2025-08-25 RX ORDER — SIMVASTATIN 10 MG
10 TABLET ORAL NIGHTLY
Qty: 90 TABLET | Refills: 0 | Status: SHIPPED | OUTPATIENT
Start: 2025-08-25

## 2025-08-25 RX ORDER — HYDROCHLOROTHIAZIDE 12.5 MG/1
12.5 CAPSULE ORAL DAILY
Qty: 90 CAPSULE | Refills: 0 | Status: SHIPPED | OUTPATIENT
Start: 2025-08-25

## 2025-08-25 RX ORDER — LOSARTAN POTASSIUM AND HYDROCHLOROTHIAZIDE 12.5; 1 MG/1; MG/1
1 TABLET ORAL DAILY
Qty: 90 TABLET | Refills: 0 | Status: SHIPPED | OUTPATIENT
Start: 2025-08-25

## 2025-08-26 RX ORDER — TADALAFIL 5 MG/1
5 TABLET ORAL DAILY
Qty: 90 TABLET | Refills: 5 | OUTPATIENT
Start: 2025-08-26

## 2025-08-26 RX ORDER — LOSARTAN POTASSIUM AND HYDROCHLOROTHIAZIDE 12.5; 1 MG/1; MG/1
1 TABLET ORAL DAILY
Qty: 30 TABLET | Refills: 0 | Status: SHIPPED | OUTPATIENT
Start: 2025-08-26

## (undated) DIAGNOSIS — E78.00 HYPERCHOLESTEREMIA: Primary | ICD-10-CM

## (undated) DEVICE — SOLUTION IRRIG 1000ML ST H2O AQUALITE PLAS

## (undated) DEVICE — E-Z CLEAN, NON-STICK, PTFE COATED, ELECTROSURGICAL NEEDLE ELECTRODE, MODIFIED EXTENDED INSULATION, 2.75 INCH (7 CM): Brand: MEGADYNE

## (undated) DEVICE — SUT CHRM GUT 3-0 27IN SH ABSRB UD 26MM 1/2

## (undated) DEVICE — GAUZE,SPONGE,FLUFF,6"X6.75",STRL,5/TRAY: Brand: MEDLINE

## (undated) DEVICE — ELECTRODE ES L7CM NDL MPLR OPN APPRCH EZ

## (undated) DEVICE — UNDERPANTS MAT 2XL FOR 24-64IN WAIST PUR

## (undated) DEVICE — PREMIUM WET SKIN PREP TRAY: Brand: MEDLINE INDUSTRIES, INC.

## (undated) DEVICE — PAD,NON-ADHERENT,3X8,STERILE,LF,1/PK: Brand: MEDLINE

## (undated) DEVICE — BREAST-HERNIA-PORT CDS-LF: Brand: MEDLINE INDUSTRIES, INC.

## (undated) DEVICE — VIOLET BRAIDED (POLYGLACTIN 910), SYNTHETIC ABSORBABLE SUTURE: Brand: COATED VICRYL

## (undated) DEVICE — ANTIBACTERIAL UNDYED BRAIDED (POLYGLACTIN 910), SYNTHETIC ABSORBABLE SUTURE: Brand: COATED VICRYL

## (undated) DEVICE — GLOVE SUR 7 SENSICARE PI PIP CRM PWD F

## (undated) DEVICE — SLEEVE COMPR MD KNEE LEN SGL USE KENDALL SCD

## (undated) DEVICE — BANDAGE,GAUZE,BULKEE II,4.5"X4.1YD,STRL: Brand: MEDLINE

## (undated) NOTE — MR AVS SNAPSHOT
[facility], [address] [address] [phone]               Thank you for choosing us for your health care visit with Berny Gray MD.  We are glad to serve you and happy to provide you with this naik If you are confident that your benefit plan will not require a referral or authorization, such as [place] Medicaid, please feel free to schedule your appointment immediately.  However, if you are unsure about the requirements for authorization, please wait Results of Recent Testing     URINALYSIS, AUTO, W/O SCOPE      Component Value Standard Range & Units    GLUCOSE (URINE DIPSTICK) neg Negative mg/dL    BILIRUBIN neg Negative    KETONES (URINE DIPSTICK) neg Negative mg/dL    SPECIFIC GRAVIT

## (undated) NOTE — MR AVS SNAPSHOT
After Visit Summary   3/14/2017    Zenia Sandoval    MRN: [id number]           Visit Information        Provider Department Dept Phone    3/14/2017 11:00 AM Bed1  [facility] [phone]      Allergies as of 3/14/2017  Reviewed on: 2/27/2017    N